# Patient Record
Sex: MALE | NOT HISPANIC OR LATINO | URBAN - METROPOLITAN AREA
[De-identification: names, ages, dates, MRNs, and addresses within clinical notes are randomized per-mention and may not be internally consistent; named-entity substitution may affect disease eponyms.]

---

## 2022-12-19 ENCOUNTER — TELEPHONE (OUTPATIENT)
Dept: GASTROENTEROLOGY | Facility: CLINIC | Age: 67
End: 2022-12-19

## 2022-12-19 NOTE — TELEPHONE ENCOUNTER
12/19/22  Screened by: Hernan Perez    Referring Provider     Pre- Screening: There is no height or weight on file to calculate BMI  Has patient been referred for a routine screening Colonoscopy? yes  Is the patient between 39-70 years old? yes      Previous Colonoscopy yes   If yes:    Date: 1/2018    Facility:     Reason:       SCHEDULING STAFF: If the patient is between 45yrs-49yrs, please advise patient to confirm benefits/coverage with their insurance company for a routine screening colonoscopy, some insurance carriers will only cover at [address] or older  If the patient is over 66years old, please schedule an office visit  Does the patient want to see a Gastroenterologist prior to their procedure OR are they having any GI symptoms? no    Has the patient been hospitalized or had abdominal surgery in the past 6 months? no    Does the patient use supplemental oxygen? no    Does the patient take Coumadin, Lovenox, Plavix, Elliquis, Xarelto, or other blood thinning medication? no    Has the patient had a stroke, cardiac event, or stent placed in the past year? yes    SCHEDULING STAFF: If patient answers NO to above questions, then schedule procedure  If patient answers YES to above questions, then schedule office appointment  Pt Failed OA    If patient is between 45yrs - 49yrs, please advise patient that we will have to confirm benefits & coverage with their insurance company for a routine screening colonoscopy

## 2022-12-19 NOTE — TELEPHONE ENCOUNTER
Patients GI provider:  EDEL Wade    Number to return call: [phone]    Reason for call: Pt provided insurance information but was unable verify       Scheduled procedure/appointment date if applicable: 0/24/3814

## 2023-03-24 ENCOUNTER — OFFICE VISIT (OUTPATIENT)
Dept: GASTROENTEROLOGY | Facility: CLINIC | Age: 68
End: 2023-03-24

## 2023-03-24 ENCOUNTER — TELEPHONE (OUTPATIENT)
Dept: OTHER | Facility: OTHER | Age: 68
End: 2023-03-24

## 2023-03-24 ENCOUNTER — TELEPHONE (OUTPATIENT)
Dept: GASTROENTEROLOGY | Facility: CLINIC | Age: 68
End: 2023-03-24

## 2023-03-24 VITALS
DIASTOLIC BLOOD PRESSURE: 97 MMHG | SYSTOLIC BLOOD PRESSURE: 198 MMHG | WEIGHT: 225 LBS | BODY MASS INDEX: 31.5 KG/M2 | HEIGHT: 71 IN

## 2023-03-24 DIAGNOSIS — R19.7 DIARRHEA, UNSPECIFIED TYPE: Primary | ICD-10-CM

## 2023-03-24 RX ORDER — SIMVASTATIN 20 MG
20 TABLET ORAL EVERY EVENING
COMMUNITY
Start: 2023-03-15

## 2023-03-24 RX ORDER — FEXOFENADINE HYDROCHLORIDE 60 MG/1
TABLET, FILM COATED ORAL
COMMUNITY

## 2023-03-24 RX ORDER — ALBUTEROL SULFATE 0.63 MG/3ML
SOLUTION RESPIRATORY (INHALATION)
COMMUNITY

## 2023-03-24 RX ORDER — FINASTERIDE 5 MG/1
5 TABLET, FILM COATED ORAL DAILY
COMMUNITY
Start: 2023-02-14

## 2023-03-24 RX ORDER — LEVOTHYROXINE SODIUM 0.15 MG/1
TABLET ORAL
COMMUNITY
Start: 2023-03-15

## 2023-03-24 RX ORDER — IBUPROFEN 200 MG
TABLET ORAL
COMMUNITY

## 2023-03-24 RX ORDER — HYDROCHLOROTHIAZIDE 25 MG/1
TABLET ORAL
COMMUNITY

## 2023-03-24 RX ORDER — OMEPRAZOLE 20 MG/1
20 CAPSULE, DELAYED RELEASE ORAL DAILY
COMMUNITY
Start: 2023-02-04

## 2023-03-24 RX ORDER — FLUTICASONE PROPIONATE 50 MCG
SPRAY, SUSPENSION (ML) NASAL
COMMUNITY

## 2023-03-24 NOTE — PROGRESS NOTES
[facility] [facility] - Outpatient Consultation  Cate Raygoza  75 y o  male MRN: [id number]  Encounter: [id number]    ASSESSMENT AND PLAN:      1  Diarrhea  6-month history of frequent diarrhea occurring 2-3 times per week-reports watery explosive BM 2-3 times each episode  Mild associated abdominal cramping  Does have formed bowel movements at times, no melena or rectal bleeding  Antibiotic use in January for sinusitis  No recent travel  -Check stool samples for C  difficile, Giardia and stool enteric panel  -Check labs including CBC, CMP and TSH  -Biopsy for microscopic colitis on colonoscopy as below    2  History of colon polyp  Colonoscopy January 2018-1 tubular adenoma and 1 sessile adenoma excised  Due for surveillance  Also reports family history of colon polyps in his brother  Reports father found to have metastatic colon cancer on autopsy at age 68  -[facility]uled [facility]    Followup Appointment: 3 months  ______________________________________________________________________    Chief Complaint   Patient presents with   • Schedule Colonoscopy       HPI:   Cate Raygoza  is a 75y o  year old male who presents to schedule screening colonoscopy  Prior colonoscopy in January 2018- 1 adenomatous polyp and 1 serrated adenoma excised  Patient is due for surveillance colonoscopy    Reports frequent diarrhea over the past 6 months with watery explosive bowel movements 1-2 times per week  Can have 2-3 bowel movements with episodes  Mild associated lower abdominal cramping which improves after bowel movement    Does have formed bowel movements and denies melena or rectal bleeding    Reports significant stress related to death of his brother and handling his estate    Was treated with antibiotics in January due to sinus infection-denies increased diarrhea after antibiotic use  Denies recent travel, no change in medication        Historical Information Past Medical History:   Diagnosis Date   • Colon polyp    • GERD (gastroesophageal reflux disease)    • Hyperlipidemia    • Seasonal allergies    • Thyroid disease      Past Surgical History:   Procedure Laterality Date   • COLONOSCOPY       Social History     Substance and Sexual Activity   Alcohol Use Yes    Comment: occasional     Social History     Substance and Sexual Activity   Drug Use Not on file     Social History     Tobacco Use   Smoking Status Never   Smokeless Tobacco Never     Family History   Problem Relation Age of Onset   • Colon cancer Father    • Diabetes Brother        Meds/Allergies     Current Outpatient Medications:   •  albuterol (ACCUNEB) 0 63 MG/3ML nebulizer solution  •  fexofenadine (ALLEGRA) 60 MG tablet  •  finasteride (PROSCAR) 5 mg tablet  •  fluticasone (FLONASE) 50 mcg/act nasal spray  •  hydrochlorothiazide (HYDRODIURIL) 25 mg tablet  •  ibuprofen (MOTRIN) 200 mg tablet  •  levothyroxine 150 mcg tablet  •  omeprazole (PriLOSEC) 20 mg delayed release capsule  •  simvastatin (ZOCOR) 20 mg tablet    No Known Allergies    PHYSICAL EXAM:    Blood pressure (!) 198/97, height 5' 10 5" (1 791 m), weight 102 kg (225 lb)  Body mass index is 31 83 kg/m²  General Appearance: NAD, cooperative, alert  Eyes: Anicteric  ENT:  Normocephalic, atraumatic, normal mucosa  Neck:  Supple, symmetrical, trachea midline,   Resp:  Clear to auscultation bilaterally; no rales, rhonchi or wheezing; respirations unlabored   CV:  S1 S2, Regular rate and rhythm; no murmur, rub, or gallop  GI:  Soft, non-tender, non-distended; normal bowel sounds; no masses, no organomegaly   Rectal: Deferred  Musculoskeletal: No cyanosis, clubbing or edema  Normal ROM  Skin:  No jaundice, rashes, or lesions   Psych: Normal affect, good eye contact  Neuro: No gross deficits, AAOx3          REVIEW OF SYSTEMS:    CONSTITUTIONAL: Denies any fever, chills, rigors, and weight loss  HEENT: No earache or tinnitus   Denies hearing loss or visual disturbances  CARDIOVASCULAR: No chest pain or palpitations  RESPIRATORY: Denies any cough, hemoptysis, shortness of breath or dyspnea on exertion  GASTROINTESTINAL: As noted in the History of Present Illness  GENITOURINARY: No problems with urination  Denies any hematuria or dysuria  NEUROLOGIC: No dizziness or vertigo, denies headaches  MUSCULOSKELETAL: Denies any muscle or joint pain  SKIN: Denies skin rashes or itching  ENDOCRINE: Denies excessive thirst  Denies intolerance to heat or cold  PSYCHOSOCIAL: Denies depression or anxiety  Denies any recent memory loss

## 2023-03-24 NOTE — LETTER
March 24, 2023     [facility], [address] [facility]    Patient: Jyoti Ayala  YOB: 1955   Date of Visit: 3/24/2023       Dear Dr Heraclio Qureshi: Thank you for referring Zulay Castillo to me for evaluation  Below are my notes for this consultation  If you have questions, please do not hesitate to call me  I look forward to following your patient along with you  Sincerely,        YIN Meza        CC: No Recipients  YIN Meza  3/24/2023  2:59 PM  Incomplete    [address] [facility] - Outpatient Consultation  Jyoti Ayala  75 y o  male MRN: [id number]  Encounter: [id number]    ASSESSMENT AND PLAN:      1  Diarrhea  6-month history of frequent diarrhea occurring 2-3 times per week-reports watery explosive BM 2-3 times each episode  Mild associated abdominal cramping  Does have formed bowel movements at times, no melena or rectal bleeding  Antibiotic use in January for sinusitis  No recent travel  -Check stool samples for C  difficile, Giardia and stool enteric panel  -Check labs including CBC, CMP and TSH  -Biopsy for microscopic colitis on colonoscopy as below    2  History of colon polyp  Colonoscopy January 2018-1 tubular adenoma and 1 sessile adenoma excised  Due for surveillance  Also reports family history of colon polyps in his brother  Reports father found to have metastatic colon cancer on autopsy at age 68  -Scheduled for colonoscopy at [facility]    Followup Appointment: As needed  ______________________________________________________________________    Chief Complaint   Patient presents with   • Schedule Colonoscopy       HPI:   Jyoti Ayala  is a 75y o  year old male who presents to schedule screening colonoscopy  Prior colonoscopy in January 2018- 1 adenomatous polyp and 1 serrated adenoma excised    Patient is due for surveillance colonoscopy    Reports frequent diarrhea over the past 6 months with watery explosive bowel movements 1-2 times per week  Can have 2-3 bowel movements with episodes  Mild associated lower abdominal cramping which improves after bowel movement  Does have formed bowel movements and denies melena or rectal bleeding    Reports significant stress related to death of his brother and handling his estate    Was treated with antibiotics in January due to sinus infection-denies increased diarrhea after antibiotic use  Denies recent travel, no change in medication        Historical Information   Past Medical History:   Diagnosis Date   • Colon polyp    • GERD (gastroesophageal reflux disease)    • Hyperlipidemia    • Seasonal allergies    • Thyroid disease      Past Surgical History:   Procedure Laterality Date   • COLONOSCOPY       Social History     Substance and Sexual Activity   Alcohol Use Yes    Comment: occasional     Social History     Substance and Sexual Activity   Drug Use Not on file     Social History     Tobacco Use   Smoking Status Never   Smokeless Tobacco Never     Family History   Problem Relation Age of Onset   • Colon cancer Father    • Diabetes Brother        Meds/Allergies      Current Outpatient Medications:   •  albuterol (ACCUNEB) 0 63 MG/3ML nebulizer solution  •  fexofenadine (ALLEGRA) 60 MG tablet  •  finasteride (PROSCAR) 5 mg tablet  •  fluticasone (FLONASE) 50 mcg/act nasal spray  •  hydrochlorothiazide (HYDRODIURIL) 25 mg tablet  •  ibuprofen (MOTRIN) 200 mg tablet  •  levothyroxine 150 mcg tablet  •  omeprazole (PriLOSEC) 20 mg delayed release capsule  •  simvastatin (ZOCOR) 20 mg tablet    No Known Allergies    PHYSICAL EXAM:    Blood pressure (!) 198/97, height 5' 10 5" (1 791 m), weight 102 kg (225 lb)  Body mass index is 31 83 kg/m²  General Appearance: NAD, cooperative, alert  Eyes: Anicteric  ENT:  Normocephalic, atraumatic, normal mucosa      Neck:  Supple, symmetrical, trachea midline,   Resp:  Clear to auscultation bilaterally; no rales, rhonchi or wheezing; respirations unlabored   CV:  S1 S2, Regular rate and rhythm; no murmur, rub, or gallop  GI:  Soft, non-tender, non-distended; normal bowel sounds; no masses, no organomegaly   Rectal: Deferred  Musculoskeletal: No cyanosis, clubbing or edema  Normal ROM  Skin:  No jaundice, rashes, or lesions   Psych: Normal affect, good eye contact  Neuro: No gross deficits, AAOx3          REVIEW OF SYSTEMS:    CONSTITUTIONAL: Denies any fever, chills, rigors, and weight loss  HEENT: No earache or tinnitus  Denies hearing loss or visual disturbances  CARDIOVASCULAR: No chest pain or palpitations  RESPIRATORY: Denies any cough, hemoptysis, shortness of breath or dyspnea on exertion  GASTROINTESTINAL: As noted in the History of Present Illness  GENITOURINARY: No problems with urination  Denies any hematuria or dysuria  NEUROLOGIC: No dizziness or vertigo, denies headaches  MUSCULOSKELETAL: Denies any muscle or joint pain  SKIN: Denies skin rashes or itching  ENDOCRINE: Denies excessive thirst  Denies intolerance to heat or cold  PSYCHOSOCIAL: Denies depression or anxiety  Denies any recent memory loss

## 2023-03-24 NOTE — TELEPHONE ENCOUNTER
Patient is requesting a call back from the office regarding some questions he has from today's visit  He needs to have blood work done but does not have an order to take to [employer] and regarding the stool sample he wants to know if the sample has to be firm stool or if its diarrhea can that be used    Please advise

## 2023-03-24 NOTE — TELEPHONE ENCOUNTER
Scheduled date of colonoscopy (as of today): 4/21/23  Physician performing colonoscopy: Dr Mike Lombardo  [facility]  Bowel prep reviewed with patient: Miralax  Instructions reviewed with patient by: Rob Paz  Clearances: none    **patient was given C-diff orders-patient knows he needs to get done soon

## 2023-03-24 NOTE — TELEPHONE ENCOUNTER
I left two voicemail's for patient, advising blood work & stool orders have been electronically submitted to [facility]  He can go to any service center for blood draw & obtain stool specimen containers  I will put a hard copy of the orders at our  if he would like to

## 2023-03-29 LAB
ALBUMIN SERPL-MCNC: 4.5 G/DL (ref 3.8–4.8)
ALBUMIN/GLOB SERPL: 1.9 {RATIO} (ref 1.2–2.2)
ALP SERPL-CCNC: 60 IU/L (ref 44–121)
ALT SERPL-CCNC: 17 IU/L (ref 0–44)
AST SERPL-CCNC: 23 IU/L (ref 0–40)
BASOPHILS # BLD AUTO: 0.1 X10E3/UL (ref 0–0.2)
BASOPHILS NFR BLD AUTO: 1 %
BILIRUB SERPL-MCNC: 1 MG/DL (ref 0–1.2)
BUN SERPL-MCNC: 20 MG/DL (ref 8–27)
BUN/CREAT SERPL: 16 (ref 10–24)
CALCIUM SERPL-MCNC: 9.8 MG/DL (ref 8.6–10.2)
CHLORIDE SERPL-SCNC: 107 MMOL/L (ref 96–106)
CO2 SERPL-SCNC: 27 MMOL/L (ref 20–29)
CREAT SERPL-MCNC: 1.23 MG/DL (ref 0.76–1.27)
EGFR: 64 ML/MIN/1.73
EOSINOPHIL # BLD AUTO: 0.2 X10E3/UL (ref 0–0.4)
EOSINOPHIL NFR BLD AUTO: 2 %
ERYTHROCYTE [DISTWIDTH] IN BLOOD BY AUTOMATED COUNT: 12.6 % (ref 11.6–15.4)
GLOBULIN SER-MCNC: 2.4 G/DL (ref 1.5–4.5)
GLUCOSE SERPL-MCNC: 89 MG/DL (ref 70–99)
HCT VFR BLD AUTO: 45 % (ref 37.5–51)
HGB BLD-MCNC: 15.1 G/DL (ref 13–17.7)
IMM GRANULOCYTES # BLD: 0 X10E3/UL (ref 0–0.1)
IMM GRANULOCYTES NFR BLD: 1 %
LYMPHOCYTES # BLD AUTO: 1.5 X10E3/UL (ref 0.7–3.1)
LYMPHOCYTES NFR BLD AUTO: 20 %
MCH RBC QN AUTO: 29.7 PG (ref 26.6–33)
MCHC RBC AUTO-ENTMCNC: 33.6 G/DL (ref 31.5–35.7)
MCV RBC AUTO: 89 FL (ref 79–97)
MONOCYTES # BLD AUTO: 0.6 X10E3/UL (ref 0.1–0.9)
MONOCYTES NFR BLD AUTO: 9 %
NEUTROPHILS # BLD AUTO: 4.9 X10E3/UL (ref 1.4–7)
NEUTROPHILS NFR BLD AUTO: 67 %
PLATELET # BLD AUTO: 256 X10E3/UL (ref 150–450)
POTASSIUM SERPL-SCNC: 4.5 MMOL/L (ref 3.5–5.2)
PROT SERPL-MCNC: 6.9 G/DL (ref 6–8.5)
RBC # BLD AUTO: 5.08 X10E6/UL (ref 4.14–5.8)
SODIUM SERPL-SCNC: 147 MMOL/L (ref 134–144)
TSH SERPL DL<=0.005 MIU/L-ACNC: 1.81 UIU/ML (ref 0.45–4.5)
WBC # BLD AUTO: 7.2 X10E3/UL (ref 3.4–10.8)

## 2023-03-31 LAB
ADV 40+41 DNA STL QL NAA+NON-PROBE: NOT DETECTED
ASTRO TYP 1-8 RNA STL QL NAA+NON-PROBE: NOT DETECTED
C CAYETANENSIS DNA STL QL NAA+NON-PROBE: NOT DETECTED
C COLI+JEJ+UPSA DNA STL QL NAA+NON-PROBE: NOT DETECTED
C DIF TOX TCDA+TCDB STL QL NAA+NON-PROBE: NOT DETECTED
C DIFF TOX GENS STL QL NAA+PROBE: NEGATIVE
CRYPTOSP DNA STL QL NAA+NON-PROBE: NOT DETECTED
E COLI O157 DNA STL QL NAA+NON-PROBE: NORMAL
E HISTOLYT DNA STL QL NAA+NON-PROBE: NOT DETECTED
EAEC PAA PLAS AGGR+AATA ST NAA+NON-PRB: NOT DETECTED
EC STX1+STX2 GENES STL QL NAA+NON-PROBE: NOT DETECTED
EPEC EAE GENE STL QL NAA+NON-PROBE: NOT DETECTED
ETEC LTA+ST1A+ST1B TOX ST NAA+NON-PROBE: NOT DETECTED
G LAMBLIA AG STL QL IA: NEGATIVE
G LAMBLIA DNA STL QL NAA+NON-PROBE: NOT DETECTED
NOROVIRUS GI+II RNA STL QL NAA+NON-PROBE: NOT DETECTED
P SHIGELLOIDES DNA STL QL NAA+NON-PROBE: NOT DETECTED
RVA RNA STL QL NAA+NON-PROBE: NOT DETECTED
S ENT+BONG DNA STL QL NAA+NON-PROBE: NOT DETECTED
SAPO I+II+IV+V RNA STL QL NAA+NON-PROBE: NOT DETECTED
SHIGELLA SP+EIEC IPAH ST NAA+NON-PROBE: NOT DETECTED
V CHOL+PARA+VUL DNA STL QL NAA+NON-PROBE: NOT DETECTED
V CHOLERAE DNA STL QL NAA+NON-PROBE: NOT DETECTED
Y ENTEROCOL DNA STL QL NAA+NON-PROBE: NOT DETECTED

## 2023-03-31 NOTE — RESULT ENCOUNTER NOTE
Stool cultures negative  Left voicemail for patient to inform him of these results    He is scheduled for colonoscopy 4/21/2023

## 2023-04-06 ENCOUNTER — TELEPHONE (OUTPATIENT)
Dept: GASTROENTEROLOGY | Facility: CLINIC | Age: 68
End: 2023-04-06

## 2023-04-21 ENCOUNTER — HOSPITAL ENCOUNTER (EMERGENCY)
Facility: HOSPITAL | Age: 68
Discharge: HOME | End: 2023-04-21
Attending: EMERGENCY MEDICINE

## 2023-04-21 ENCOUNTER — APPOINTMENT (EMERGENCY)
Dept: RADIOLOGY | Facility: HOSPITAL | Age: 68
End: 2023-04-21

## 2023-04-21 ENCOUNTER — HOSPITAL ENCOUNTER (OUTPATIENT)
Dept: GASTROENTEROLOGY | Facility: AMBULATORY SURGERY CENTER | Age: 68
Discharge: HOME | End: 2023-04-21

## 2023-04-21 VITALS
RESPIRATION RATE: 14 BRPM | WEIGHT: 225 LBS | TEMPERATURE: 98 F | OXYGEN SATURATION: 97 % | DIASTOLIC BLOOD PRESSURE: 90 MMHG | SYSTOLIC BLOOD PRESSURE: 200 MMHG | BODY MASS INDEX: 32.21 KG/M2 | HEIGHT: 70 IN | HEART RATE: 41 BPM

## 2023-04-21 VITALS
DIASTOLIC BLOOD PRESSURE: 114 MMHG | HEIGHT: 71 IN | TEMPERATURE: 97.4 F | RESPIRATION RATE: 16 BRPM | OXYGEN SATURATION: 95 % | BODY MASS INDEX: 31.5 KG/M2 | WEIGHT: 225 LBS | HEART RATE: 40 BPM | SYSTOLIC BLOOD PRESSURE: 245 MMHG

## 2023-04-21 DIAGNOSIS — R19.7 DIARRHEA, UNSPECIFIED TYPE: ICD-10-CM

## 2023-04-21 DIAGNOSIS — I48.92 ATRIAL FLUTTER (HCC): Primary | ICD-10-CM

## 2023-04-21 LAB
2HR DELTA HS TROPONIN: -15 NG/L
ALBUMIN SERPL BCP-MCNC: 4.7 G/DL (ref 3.5–5)
ALP SERPL-CCNC: 54 U/L (ref 34–104)
ALT SERPL W P-5'-P-CCNC: 18 U/L (ref 7–52)
ANION GAP SERPL CALCULATED.3IONS-SCNC: 8 MMOL/L (ref 4–13)
AST SERPL W P-5'-P-CCNC: 24 U/L (ref 13–39)
BASOPHILS # BLD AUTO: 0.05 THOUSANDS/ΜL (ref 0–0.1)
BASOPHILS NFR BLD AUTO: 1 % (ref 0–1)
BILIRUB SERPL-MCNC: 2.64 MG/DL (ref 0.2–1)
BUN SERPL-MCNC: 16 MG/DL (ref 5–25)
CALCIUM SERPL-MCNC: 10.1 MG/DL (ref 8.4–10.2)
CARDIAC TROPONIN I PNL SERPL HS: 28 NG/L
CARDIAC TROPONIN I PNL SERPL HS: 43 NG/L
CHLORIDE SERPL-SCNC: 103 MMOL/L (ref 96–108)
CO2 SERPL-SCNC: 28 MMOL/L (ref 21–32)
CREAT SERPL-MCNC: 1.04 MG/DL (ref 0.6–1.3)
EOSINOPHIL # BLD AUTO: 0.1 THOUSAND/ΜL (ref 0–0.61)
EOSINOPHIL NFR BLD AUTO: 1 % (ref 0–6)
ERYTHROCYTE [DISTWIDTH] IN BLOOD BY AUTOMATED COUNT: 13.3 % (ref 11.6–15.1)
GFR SERPL CREATININE-BSD FRML MDRD: 73 ML/MIN/1.73SQ M
GLUCOSE SERPL-MCNC: 100 MG/DL (ref 65–140)
HCT VFR BLD AUTO: 47.3 % (ref 36.5–49.3)
HGB BLD-MCNC: 15.2 G/DL (ref 12–17)
IMM GRANULOCYTES # BLD AUTO: 0.02 THOUSAND/UL (ref 0–0.2)
IMM GRANULOCYTES NFR BLD AUTO: 0 % (ref 0–2)
LYMPHOCYTES # BLD AUTO: 1.75 THOUSANDS/ΜL (ref 0.6–4.47)
LYMPHOCYTES NFR BLD AUTO: 22 % (ref 14–44)
MCH RBC QN AUTO: 29.4 PG (ref 26.8–34.3)
MCHC RBC AUTO-ENTMCNC: 32.1 G/DL (ref 31.4–37.4)
MCV RBC AUTO: 92 FL (ref 82–98)
MONOCYTES # BLD AUTO: 0.8 THOUSAND/ΜL (ref 0.17–1.22)
MONOCYTES NFR BLD AUTO: 10 % (ref 4–12)
NEUTROPHILS # BLD AUTO: 5.18 THOUSANDS/ΜL (ref 1.85–7.62)
NEUTS SEG NFR BLD AUTO: 66 % (ref 43–75)
NRBC BLD AUTO-RTO: 0 /100 WBCS
PLATELET # BLD AUTO: 218 THOUSANDS/UL (ref 149–390)
PMV BLD AUTO: 12.2 FL (ref 8.9–12.7)
POTASSIUM SERPL-SCNC: 3.6 MMOL/L (ref 3.5–5.3)
PROT SERPL-MCNC: 7.6 G/DL (ref 6.4–8.4)
RBC # BLD AUTO: 5.17 MILLION/UL (ref 3.88–5.62)
SODIUM SERPL-SCNC: 139 MMOL/L (ref 135–147)
WBC # BLD AUTO: 7.9 THOUSAND/UL (ref 4.31–10.16)

## 2023-04-21 RX ORDER — SODIUM CHLORIDE, SODIUM LACTATE, POTASSIUM CHLORIDE, CALCIUM CHLORIDE 600; 310; 30; 20 MG/100ML; MG/100ML; MG/100ML; MG/100ML
50 INJECTION, SOLUTION INTRAVENOUS CONTINUOUS
Status: DISCONTINUED | OUTPATIENT
Start: 2023-04-21 | End: 2023-04-25 | Stop reason: HOSPADM

## 2023-04-21 NOTE — QUICK NOTE
Placed pt on Monitor to see he is in A  Flutter at rate of 40 /114  Pt asymptomatic  Dr Neyda Mccullough made aware   Procedure cancelled

## 2023-04-21 NOTE — ED NOTES
Patient called from waiting room - spouse stated he is in the restroom      Irgatito Sandoval RN  04/21/23 2620

## 2023-04-21 NOTE — ED PROVIDER NOTES
History  Chief Complaint   Patient presents with   • Abnormal ECG     Pt to er from GI doc  Was scheduled for a colonoscopy today but did not have it completed due to his ekg showing that he was in a-flutter with a rate of 30's-40's  No hx  Patient has no complaints  • Hypertension     Pt had a high blood pressure at gi appt (240/117 per staff)  Patient has no complaints  Denies chest pain, sob, blurred vision      75 yom presents for evaluation of asymptomatic bradycardia/atrial flutter  Patient was being evaluated for colonoscopy today and during screening found to be bradycardic  EKG reveals atrial flutter  Patient denies any symptoms of chest pain, shortness of breath, nausea, vomiting, diarrhea, abdominal pain or lightheadedness  Overall feels well  Prior to Admission Medications   Prescriptions Last Dose Informant Patient Reported? Taking?    albuterol (PROVENTIL HFA,VENTOLIN HFA) 90 mcg/act inhaler   Yes No   Sig: Inhale 2 puffs every 6 (six) hours as needed for wheezing   fexofenadine (ALLEGRA) 60 MG tablet   Yes No   finasteride (PROSCAR) 5 mg tablet   Yes No   Sig: Take 5 mg by mouth daily   fluticasone (FLONASE) 50 mcg/act nasal spray   Yes No   hydrochlorothiazide (HYDRODIURIL) 25 mg tablet   Yes No   ibuprofen (MOTRIN) 200 mg tablet   Yes No   levothyroxine 150 mcg tablet   Yes No   Sig: TAKE 1 TABLET BY MOUTH EVERY DAY IN THE MORNING ON EMPTY STOMACH   omeprazole (PriLOSEC) 20 mg delayed release capsule   Yes No   Sig: Take 20 mg by mouth daily   simvastatin (ZOCOR) 20 mg tablet   Yes No   Sig: Take 20 mg by mouth every evening      Facility-Administered Medications: None       Past Medical History:   Diagnosis Date   • Colon polyp    • GERD (gastroesophageal reflux disease)    • Hyperlipidemia    • Seasonal allergies    • Thyroid disease        Past Surgical History:   Procedure Laterality Date   • ACHILLES TENDON REPAIR Left    • COLONOSCOPY         Family History   Problem Relation Age of Onset   • Colon cancer Father    • Diabetes Brother      I have reviewed and agree with the history as documented  E-Cigarette/Vaping   • E-Cigarette Use Never User      E-Cigarette/Vaping Substances     Social History     Tobacco Use   • Smoking status: Never   • Smokeless tobacco: Never   Vaping Use   • Vaping Use: Never used   Substance Use Topics   • Alcohol use: Yes     Comment: occasional   • Drug use: Never       Review of Systems   Constitutional: Negative for fever  Respiratory: Negative for shortness of breath  Neurological: Negative for light-headedness  Physical Exam  Physical Exam  Vitals and nursing note reviewed  Constitutional:       General: He is not in acute distress  Appearance: He is well-developed  HENT:      Head: Normocephalic and atraumatic  Right Ear: External ear normal       Left Ear: External ear normal       Nose: Nose normal    Eyes:      General: No scleral icterus  Cardiovascular:      Rate and Rhythm: Bradycardia present  Pulmonary:      Effort: Pulmonary effort is normal  No respiratory distress  Abdominal:      General: There is no distension  Palpations: Abdomen is soft  Musculoskeletal:         General: No deformity  Normal range of motion  Cervical back: Normal range of motion and neck supple  Skin:     General: Skin is warm  Findings: No rash  Neurological:      General: No focal deficit present  Mental Status: He is alert        Gait: Gait normal    Psychiatric:         Mood and Affect: Mood normal          Vital Signs  ED Triage Vitals   Temperature Pulse Respirations Blood Pressure SpO2   04/21/23 1442 04/21/23 1438 04/21/23 1438 04/21/23 1438 04/21/23 1438   98 °F (36 7 °C) (!) 42 18 134/78 97 %      Temp Source Heart Rate Source Patient Position - Orthostatic VS BP Location FiO2 (%)   04/21/23 1442 04/21/23 1438 04/21/23 1438 04/21/23 1438 --   Temporal Monitor Lying Right arm       Pain Score       -- Vitals:    04/21/23 1438 04/21/23 1439   BP: 134/78    Pulse: (!) 42 (!) 38   Patient Position - Orthostatic VS: Lying          Visual Acuity      ED Medications  Medications - No data to display    Diagnostic Studies  Results Reviewed     Procedure Component Value Units Date/Time    HS Troponin I 4hr [[id number]]     Lab Status: No result Specimen: Blood     HS Troponin 0hr (reflex protocol) [[id number]]  (Normal) Collected: 04/21/23 1440    Lab Status: Final result Specimen: Blood from Arm, Left Updated: 04/21/23 1507     hs TnI 0hr 43 ng/L     HS Troponin I 2hr [[id number]]     Lab Status: No result Specimen: Blood     Comprehensive metabolic panel [[id number]]  (Abnormal) Collected: 04/21/23 1440    Lab Status: Final result Specimen: Blood from Arm, Left Updated: 04/21/23 1459     Sodium 139 mmol/L      Potassium 3 6 mmol/L      Chloride 103 mmol/L      CO2 28 mmol/L      ANION GAP 8 mmol/L      BUN 16 mg/dL      Creatinine 1 04 mg/dL      Glucose 100 mg/dL      Calcium 10 1 mg/dL      AST 24 U/L      ALT 18 U/L      Alkaline Phosphatase 54 U/L      Total Protein 7 6 g/dL      Albumin 4 7 g/dL      Total Bilirubin 2 64 mg/dL      eGFR 73 ml/min/1 73sq m     Narrative:      Kenny guidelines for Chronic Kidney Disease (CKD):   •  Stage 1 with normal or high GFR (GFR > 90 mL/min/1 73 square meters)  •  Stage 2 Mild CKD (GFR = 60-89 mL/min/1 73 square meters)  •  Stage 3A Moderate CKD (GFR = 45-59 mL/min/1 73 square meters)  •  Stage 3B Moderate CKD (GFR = 30-44 mL/min/1 73 square meters)  •  Stage 4 Severe CKD (GFR = 15-29 mL/min/1 73 square meters)  •  Stage 5 End Stage CKD (GFR <15 mL/min/1 73 square meters)  Note: GFR calculation is accurate only with a steady state creatinine    CBC and differential [[id number]] Collected: 04/21/23 1440    Lab Status: Final result Specimen: Blood from Arm, Left Updated: 04/21/23 1445     WBC 7 90 Thousand/uL      RBC 5 17 Million/uL      Hemoglobin 15 2 g/dL      Hematocrit 47 3 %      MCV 92 fL      MCH 29 4 pg      MCHC 32 1 g/dL      RDW 13 3 %      MPV 12 2 fL      Platelets 771 Thousands/uL      nRBC 0 /100 WBCs      Neutrophils Relative 66 %      Immat GRANS % 0 %      Lymphocytes Relative 22 %      Monocytes Relative 10 %      Eosinophils Relative 1 %      Basophils Relative 1 %      Neutrophils Absolute 5 18 Thousands/µL      Immature Grans Absolute 0 02 Thousand/uL      Lymphocytes Absolute 1 75 Thousands/µL      Monocytes Absolute 0 80 Thousand/µL      Eosinophils Absolute 0 10 Thousand/µL      Basophils Absolute 0 05 Thousands/µL                  XR chest 1 view portable   Final Result by Viva Opitz, MD (04/21 1528)      Mild cardiomegaly      No acute cardiopulmonary disease  Workstation performed: [id number]                    Procedures  Procedures         ED Course  ED Course as of 04/21/23 1619   Fri Apr 21, 2023   1540 D/w cardiology regarding asymptomatic atrial flutter with troponin 43  Advised holter monitor and outpatient FU     1617 Procedure Note: EKG  Date/Time: 04/21/23 4:17 PM   Performed by: Vale Reza  Authorized by: Vale Reza  ECG interpreted by me, the ED Provider: yes   The EKG demonstrates:  Rate 43  Rhythm Atrial flutter with AV block 8:1  QTc 420  \No ST elevations/depressions               HEART Risk Score    Flowsheet Row Most Recent Value   Heart Score Risk Calculator    History 0 Filed at: 04/21/2023 1619   ECG 1 Filed at: 04/21/2023 1619   Age 2 Filed at: 04/21/2023 1619   Risk Factors 0 Filed at: 04/21/2023 1619   Troponin 2 Filed at: 04/21/2023 1619   HEART Score 5 Filed at: 04/21/2023 1619                        SBIRT 22yo+    Flowsheet Row Most Recent Value   Initial Alcohol Screen: US AUDIT-C     1  How often do you have a drink containing alcohol? 0 Filed at: 04/21/2023 1439   2  How many drinks containing alcohol do you have on a typical day you are drinking?   0 Filed at: 04/21/2023 1439   3a  Male UNDER 65: How often do you have five or more drinks on one occasion? 0 Filed at: 04/21/2023 1439   3b  FEMALE Any Age, or MALE 65+: How often do you have 4 or more drinks on one occassion? 0 Filed at: 04/21/2023 1439   Audit-C Score 0 Filed at: 04/21/2023 1439   COLT: How many times in the past year have you    Used an illegal drug or used a prescription medication for non-medical reasons? Never Filed at: 04/21/2023 1439                    Medical Decision Making  77-year-old male presenting with asymptomatic atrial flutter  Cardiac evaluation  EKG reveals slow atrial flutter 8:1  Discussed case with cardiology team   Likely schedule for outpatient SUDHEER cardioversion  Signed out with repeat troponin pending  Atrial flutter ([facility] [address] ): acute illness or injury  Amount and/or Complexity of Data Reviewed  External Data Reviewed: labs and notes  Labs: ordered  Radiology: ordered  ECG/medicine tests: ordered and independent interpretation performed            Disposition  Final diagnoses:   Atrial flutter ([facility] [address] )     Time reflects when diagnosis was documented in both MDM as applicable and the Disposition within this note     Time User Action Codes Description Comment    4/21/2023  3:04 PM Erika Alfaro [I48 [id number]] Atrial flutter [facility])       ED Disposition     None      Follow-up Information     Follow up With Specialties Details Why Contact Info Additional [facility]   [address] [facility] [address]  [address]  Cardiology [address], [address] [facility] [address] [facility] [address] Emergency Department Emergency Medicine  If symptoms worsen [address] [place] [address]  [address] [facility] Emergency Department, [address] [facility] [address] Patient's Medications   Discharge Prescriptions    No medications on file       Outpatient Discharge Orders   Ambulatory Referral to Cardiology   Standing Status: Future Standing Exp  Date: 04/21/24      Holter monitor   Standing Status: Future Standing Exp   Date: 04/21/27       PDMP Review     None          ED Provider  Electronically Signed by           Tomas Colby DO  04/21/23 4206

## 2023-04-21 NOTE — QUICK NOTE
bpm 220/104 hr continues to be atrial flutter at a rate of 40; copy of EKG sent with pt; pt and wife going to [facility] ER

## 2023-04-26 LAB
ATRIAL RATE: 277 BPM
QRS AXIS: -47 DEGREES
QRSD INTERVAL: 100 MS
QT INTERVAL: 498 MS
QTC INTERVAL: 420 MS
T WAVE AXIS: 74 DEGREES
VENTRICULAR RATE: 43 BPM

## 2023-05-08 ENCOUNTER — HOSPITAL ENCOUNTER (OUTPATIENT)
Dept: NON INVASIVE DIAGNOSTICS | Facility: HOSPITAL | Age: 68
Discharge: HOME | End: 2023-05-08
Attending: EMERGENCY MEDICINE

## 2023-05-08 DIAGNOSIS — I48.92 ATRIAL FLUTTER (HCC): ICD-10-CM

## 2023-05-13 NOTE — RESULT ENCOUNTER NOTE
LMOM x 1  Discussed results with cardiology on call, Dr Raissa Alves, he suggests patient f/u in the office and to start blood thinner

## 2023-05-16 ENCOUNTER — TELEPHONE (OUTPATIENT)
Dept: CARDIOLOGY CLINIC | Facility: CLINIC | Age: 68
End: 2023-05-16

## 2023-05-16 NOTE — TELEPHONE ENCOUNTER
Per girma Archer to [facility] in the [facility] or [facility] office with him this week to be seen sooner than 7/6 as scheduled  I left two voicemails for patient asking him to call back to schedule

## 2023-05-18 ENCOUNTER — OFFICE VISIT (OUTPATIENT)
Dept: CARDIOLOGY CLINIC | Facility: CLINIC | Age: 68
End: 2023-05-18

## 2023-05-18 VITALS
OXYGEN SATURATION: 97 % | DIASTOLIC BLOOD PRESSURE: 88 MMHG | WEIGHT: 231 LBS | HEART RATE: 42 BPM | BODY MASS INDEX: 33.07 KG/M2 | HEIGHT: 70 IN | SYSTOLIC BLOOD PRESSURE: 190 MMHG

## 2023-05-18 DIAGNOSIS — R00.1 BRADYCARDIA: ICD-10-CM

## 2023-05-18 DIAGNOSIS — I10 PRIMARY HYPERTENSION: ICD-10-CM

## 2023-05-18 DIAGNOSIS — I48.92 ATRIAL FLUTTER, UNSPECIFIED TYPE (HCC): Primary | ICD-10-CM

## 2023-05-18 RX ORDER — AMOXICILLIN 500 MG/1
2000 CAPSULE ORAL AS NEEDED
COMMUNITY

## 2023-05-18 RX ORDER — OXYMETAZOLINE HYDROCHLORIDE 0.05 G/100ML
2 SPRAY NASAL 2 TIMES DAILY
COMMUNITY

## 2023-05-18 RX ORDER — LISINOPRIL 10 MG/1
TABLET ORAL
COMMUNITY
Start: 2023-04-25 | End: 2023-05-18 | Stop reason: SDUPTHER

## 2023-05-18 RX ORDER — LISINOPRIL 20 MG/1
20 TABLET ORAL DAILY
Qty: 90 TABLET | Refills: 3 | Status: SHIPPED | OUTPATIENT
Start: 2023-05-18

## 2023-05-18 RX ORDER — ASCORBIC ACID 1000 MG
TABLET ORAL
COMMUNITY

## 2023-05-18 NOTE — PROGRESS NOTES
Consultation - Cardiology   Hieu Duval  70 y o  male MRN: [id number]    Encounter: [id number]    1  Atrial flutter, unspecified type ([address] )  Echo complete w/ contrast if indicated    rivaroxaban (Xarelto) 20 mg tablet      2  Primary hypertension  lisinopril (ZESTRIL) 20 mg tablet      3  Bradycardia            Assessment/Plan     Assessment:     Persistent Atrial Flutter  Bradycardia  Hypertension    Plan:    Persistent Atrial Flutter: Unclear onset and found incidentally  [id number] = 2  EP consult placed for flutter ablation  Chest xray shows cardiomegaly  No evidence of clinical heart failure on exam today  Check echocardiogram     HTN: Uncontrolled  Increase lisinopril to 20 mg daily  History of Present Illness   Physician Requesting Consult: No att  providers found  Reason for Consult / Principal Problem: Atrial Flutter  HPI: Hieu Duval  is a 70y o  year old male who presents with recent diagnosis of atrial flutter  He was having a colonoscopy and found to be in atrial flutter and his BP was markedly elevated at that time  He has been monitoring his BP at home which has been consistently high  He denies any significant shortness of breath, chest pain or palpitations  He has no prior history of atrial fibrillation, cad or chf    He has a history of Hypertension and hyperlipidemia  Review of Systems   Constitutional: Negative  HENT: Negative  Eyes: Negative  Cardiovascular: Negative  Respiratory: Negative  Endocrine: Negative  Hematologic/Lymphatic: Negative  Skin: Negative  Musculoskeletal: Negative  Gastrointestinal: Negative  Genitourinary: Negative  Neurological: Negative  Psychiatric/Behavioral: Negative  Allergic/Immunologic: Negative          Historical Information   Past Medical History:   Diagnosis Date   • Colon polyp    • GERD (gastroesophageal reflux disease)    • Hyperlipidemia    • Seasonal allergies    • Thyroid disease      Past Surgical History:   Procedure Laterality Date   • ACHILLES TENDON REPAIR Left    • COLONOSCOPY         Social History:  Social History     Substance and Sexual Activity   Alcohol Use Yes    Comment: occasional     Social History     Substance and Sexual Activity   Drug Use Never     Social History     Tobacco Use   Smoking Status Never   Smokeless Tobacco Never       Family History:   Family History   Problem Relation Age of Onset   • Colon cancer Father    • Diabetes Brother        Meds/Allergies   No Known Allergies    Current Outpatient Medications:   •  albuterol (PROVENTIL HFA,VENTOLIN HFA) 90 mcg/act inhaler, Inhale 2 puffs every 6 (six) hours as needed for wheezing, Disp: , Rfl:   •  amoxicillin (AMOXIL) 500 mg capsule, Take 2,000 mg by mouth if needed Prior to dental work, Disp: , Rfl:   •  fexofenadine (ALLEGRA) 60 MG tablet, , Disp: , Rfl:   •  finasteride (PROSCAR) 5 mg tablet, Take 5 mg by mouth daily, Disp: , Rfl:   •  fluticasone (FLONASE) 50 mcg/act nasal spray, , Disp: , Rfl:   •  hydrochlorothiazide (HYDRODIURIL) 25 mg tablet, , Disp: , Rfl:   •  ibuprofen (MOTRIN) 200 mg tablet, , Disp: , Rfl:   •  levothyroxine 150 mcg tablet, TAKE 1 TABLET BY MOUTH EVERY DAY IN THE MORNING ON EMPTY STOMACH, Disp: , Rfl:   •  lisinopril (ZESTRIL) 10 mg tablet, TAKE 1 TABLET BY MOUTH EVERY DAY FOR 30 DAYS, Disp: , Rfl:   •  Multiple Vitamins-Minerals (Mens 50+ Multivitamin) TABS, Take by mouth, Disp: , Rfl:   •  omeprazole (PriLOSEC) 20 mg delayed release capsule, Take 20 mg by mouth daily, Disp: , Rfl:   •  oxymetazoline (AFRIN) 0 05 % nasal spray, 2 sprays by Each Nare route 2 (two) times a day, Disp: , Rfl:   •  simvastatin (ZOCOR) 20 mg tablet, Take 20 mg by mouth every evening, Disp: , Rfl:     Vitals:   Pulse: (!) 42  Blood Pressue: (!) 190/88  Weight: 105 kg (231 lb)      Physical Exam  Constitutional:       General: He is not in acute distress  Appearance: He is well-developed  He is not diaphoretic  HENT:      Head: Normocephalic  Eyes:      General: No scleral icterus  Right eye: No discharge  Conjunctiva/sclera: Conjunctivae normal    Neck:      Vascular: No JVD  Cardiovascular:      Rate and Rhythm: Normal rate and regular rhythm  Heart sounds: No murmur heard  No friction rub  No gallop  Pulmonary:      Effort: Pulmonary effort is normal  No respiratory distress  Breath sounds: Normal breath sounds  No wheezing or rales  Abdominal:      General: Bowel sounds are normal  There is no distension  Palpations: Abdomen is soft  Tenderness: There is no abdominal tenderness  There is no rebound  Musculoskeletal:         General: No tenderness or deformity  Cervical back: Normal range of motion  Skin:     General: Skin is warm and dry  Neurological:      General: No focal deficit present  Mental Status: He is alert and oriented to person, place, and time  Psychiatric:         Mood and Affect: Mood normal          Behavior: Behavior normal          [unfilled]    Invasive Devices     None                 Lab Results   Component Value Date     04/21/2023    CO2 28 04/21/2023    BUN 16 04/21/2023    CREATININE 1 04 04/21/2023    EGFR 73 04/21/2023    CALCIUM 10 1 04/21/2023    AST 24 04/21/2023    ALT 18 04/21/2023    ALKPHOS 54 04/21/2023     Lab Results   Component Value Date    WBC 7 90 04/21/2023    HGB 15 2 04/21/2023     04/21/2023     No components found for: TROP    Imaging:     EKG: Atrial Flutter     Counseling / Coordination of Care  Total floor / unit time spent today 45 minutes  Greater than 50% of total time was spent with the patient and / or family counseling and / or coordination of care  A description of the counseling / coordination of care

## 2023-05-19 DIAGNOSIS — I10 PRIMARY HYPERTENSION: Primary | ICD-10-CM

## 2023-05-19 RX ORDER — LISINOPRIL 20 MG/1
20 TABLET ORAL DAILY
Qty: 90 TABLET | Refills: 1 | OUTPATIENT
Start: 2023-05-19

## 2023-05-19 NOTE — TELEPHONE ENCOUNTER
Per Dr Tate Petshyam -     Increase Lisinopril to 20 mg daily  Called - LM on VM as given      Will send new rx to listed Px

## 2023-06-05 ENCOUNTER — TELEPHONE (OUTPATIENT)
Dept: CARDIOLOGY CLINIC | Facility: CLINIC | Age: 68
End: 2023-06-05

## 2023-06-05 ENCOUNTER — HOSPITAL ENCOUNTER (OUTPATIENT)
Dept: NON INVASIVE DIAGNOSTICS | Age: 68
Discharge: HOME | End: 2023-06-05
Payer: MEDICARE

## 2023-06-05 VITALS
HEIGHT: 70 IN | HEART RATE: 40 BPM | DIASTOLIC BLOOD PRESSURE: 92 MMHG | BODY MASS INDEX: 33.07 KG/M2 | WEIGHT: 231 LBS | SYSTOLIC BLOOD PRESSURE: 196 MMHG

## 2023-06-05 DIAGNOSIS — I48.92 ATRIAL FLUTTER, UNSPECIFIED TYPE (HCC): ICD-10-CM

## 2023-06-05 PROCEDURE — 93306 TTE W/DOPPLER COMPLETE: CPT

## 2023-06-05 NOTE — TELEPHONE ENCOUNTER
"Patient wanted Dr Cabrera Maynard to be aware that he is not seeing much improvement in his blood pressure since Lisinopril being increased from 10mg to 20mg (1) time daily  Patient stated that his \"Resting\" Blood Pressure was 140/80 last week and now is running about 170-180/90    Please Advise  (thomas)  "

## 2023-06-06 LAB
AORTIC ROOT: 3.5 CM
APICAL FOUR CHAMBER EJECTION FRACTION: 64 %
ASCENDING AORTA: 4.4 CM
AV REGURGITATION PRESSURE HALF TIME: 1011 MS
DOP CALC LVOT AREA: 4.91 CM2
DOP CALC LVOT DIAMETER: 2.5 CM
E WAVE DECELERATION TIME: 279 MS
FRACTIONAL SHORTENING: 34 (ref 28–44)
INTERVENTRICULAR SEPTUM IN DIASTOLE (PARASTERNAL SHORT AXIS VIEW): 1.3 CM
INTERVENTRICULAR SEPTUM: 1.3 CM (ref 0.6–1.1)
IVC: 22 MM
LAAS-AP2: 41.4 CM2
LAAS-AP4: 29.7 CM2
LEFT ATRIUM AREA SYSTOLE SINGLE PLANE A4C: 29.9 CM2
LEFT ATRIUM SIZE: 5.9 CM
LEFT INTERNAL DIMENSION IN SYSTOLE: 3.9 CM (ref 2.1–4)
LEFT VENTRICULAR INTERNAL DIMENSION IN DIASTOLE: 5.9 CM (ref 3.5–6)
LEFT VENTRICULAR POSTERIOR WALL IN END DIASTOLE: 1.3 CM
LEFT VENTRICULAR STROKE VOLUME: 106 ML
LVSV (TEICH): 106 ML
MITRAL REGURGITATION PEAK VELOCITY: 6.14 M/S
MITRAL VALVE MEAN INFLOW VELOCITY: 5.25 M/S
MITRAL VALVE REGURGITANT PEAK GRADIENT: 151 MMHG
MV E'TISSUE VEL-SEP: 8 CM/S
MV PEAK A VEL: 0.01 M/S
MV PEAK E VEL: 150 CM/S
MV STENOSIS PRESSURE HALF TIME: 81 MS
MV VALVE AREA P 1/2 METHOD: 2.72
RA PRESSURE ESTIMATED: 15 MMHG
RIGHT ATRIUM AREA SYSTOLE A4C: 24.7 CM2
RIGHT VENTRICLE ID DIMENSION: 4.6 CM
RV PSP: 42 MMHG
SL CV AV DECELERATION TIME RETROGRADE: 3487 MS
SL CV AV PEAK GRADIENT RETROGRADE: 71 MMHG
SL CV DOP CALC MV VTI RETROGRADE: 213.9 CM
SL CV LEFT ATRIUM LENGTH A2C: 8.3 CM
SL CV LV EF: 55
SL CV MV MEAN GRADIENT RETROGRADE: 116 MMHG
SL CV PED ECHO LEFT VENTRICLE DIASTOLIC VOLUME (MOD BIPLANE) 2D: 173 ML
SL CV PED ECHO LEFT VENTRICLE SYSTOLIC VOLUME (MOD BIPLANE) 2D: 67 ML
TR MAX PG: 27 MMHG
TR PEAK VELOCITY: 2.6 M/S
TRICUSPID ANNULAR PLANE SYSTOLIC EXCURSION: 2.5 CM

## 2023-06-06 PROCEDURE — 93306 TTE W/DOPPLER COMPLETE: CPT | Performed by: INTERNAL MEDICINE

## 2023-06-08 ENCOUNTER — TELEPHONE (OUTPATIENT)
Dept: CARDIOLOGY CLINIC | Facility: CLINIC | Age: 68
End: 2023-06-08

## 2023-06-08 DIAGNOSIS — I10 PRIMARY HYPERTENSION: Primary | ICD-10-CM

## 2023-06-08 RX ORDER — HYDROCHLOROTHIAZIDE 12.5 MG/1
12.5 TABLET ORAL DAILY
Qty: 90 TABLET | Refills: 1 | Status: SHIPPED | OUTPATIENT
Start: 2023-06-08

## 2023-06-08 NOTE — TELEPHONE ENCOUNTER
"All questions in this message are from pt:    Pt wants you to know that his \"PRN\" hctz 25mg, in his chart, he is currently taking, consistently, every other day  You said add hctz 12 5mg daily so that would be 12 5mg every other day and 37 5mg every other day  Is this what you want? He is also wondering if you want to see him back sooner than August?        Please advise    "

## 2023-06-08 NOTE — TELEPHONE ENCOUNTER
BMP Benzoyl Peroxide Counseling: Patient counseled that medicine may cause skin irritation and bleach clothing.  In the event of skin irritation, the patient was advised to reduce the amount of the drug applied or use it less frequently.   The patient verbalized understanding of the proper use and possible adverse effects of benzoyl peroxide.  All of the patient's questions and concerns were addressed.

## 2023-06-08 NOTE — TELEPHONE ENCOUNTER
NELSON for pt to call us back to relay message  Sent [employer] message relaying information  BMP ordered  Medication sent to [facility], [place]

## 2023-06-12 ENCOUNTER — CONSULT (OUTPATIENT)
Dept: CARDIOLOGY CLINIC | Facility: CLINIC | Age: 68
End: 2023-06-12
Payer: MEDICARE

## 2023-06-12 VITALS
BODY MASS INDEX: 33 KG/M2 | DIASTOLIC BLOOD PRESSURE: 92 MMHG | HEART RATE: 46 BPM | SYSTOLIC BLOOD PRESSURE: 164 MMHG | WEIGHT: 230 LBS

## 2023-06-12 DIAGNOSIS — I48.92 ATRIAL FLUTTER, UNSPECIFIED TYPE (HCC): Primary | ICD-10-CM

## 2023-06-12 PROCEDURE — 99215 OFFICE O/P EST HI 40 MIN: CPT | Performed by: INTERNAL MEDICINE

## 2023-06-12 PROCEDURE — 93000 ELECTROCARDIOGRAM COMPLETE: CPT | Performed by: INTERNAL MEDICINE

## 2023-06-12 RX ORDER — QUETIAPINE FUMARATE 50 MG/1
TABLET, FILM COATED ORAL
COMMUNITY
Start: 2023-06-06

## 2023-06-12 NOTE — PROGRESS NOTES
Electrophysiology Consult   Heart & Vascular [address] [facility] Cardiology [facility]  [address] [facility], [place], [address]    Name: Aurea Arshad  : 1955  MRN: [id number]    ASSESSMENT:  1  Atrial flutter, unspecified type ([address] )  POCT ECG          PLAN:  1  Atrial flutter  a  Presents with EKG here with what appears to be atrial flutter with slow ventricular response   b  He has been on [facility] with xarelto since intially seen outpatient   c  No AV nodals given # 2  d  Echo with 23 EF 55-60%  LA severely dilated   e  Currently asx is exercising at home with no symptoms  2  Bradycardia   a  Reports normal rates 50-60's   b  holter showing avg rates 41 as low as 30's   c  Relatively asx but unable to elevate rates > 50's on treadmill per patient   d  Underlying rhythm unknown as no viable EKG without flutter   e  Discussed ppm   3  Hypertension   a  bp noramlly well controlled per patient 120's over 80's   b  Recently bp significantly elevated since rhythm disturbance   c  On lisinopril 20 mg, HCTZ 12 5 mg   d  TSH 3/28 normal     IMPRESSION:  -patient in atrial flutter slow ventricular response here    -discussed flutter ablation with patient who is unsure about procedure at this time    -unsure of underlying rhythm given no pre flutter EKG's so discussed potential for pacer if needed post ablation as well  -patient will discuss with his family and call to schedule   -as for blood pressure this is surely a response to his bradycardia  Our fix for this would be ablation  Will not add BP meds at this time as this is likely helping CO  Patient is to follow up in our EP office in pending flutter ablation decision  He is to call our office with any further questions or concerns in the meantime  HPI:   Interim history: Aurea Arshad  is a 70 y o  male with history of hypertension, hyperlipidemia, hypothyroidism, GERD   He presents today for consultation for new atrial flutter with slow ventricular response  Briefly, patient's history began in March of this year when he was undergoing testing for colonoscopy  He was found to have significantly elevated blood pressures on initial consultation for colonoscopy and was sent to the emergency department where he is found to be in 8-1 atrial flutter with slow ventricular response  Patient does follow with outside medical service and records, especially prior EKGs are unavailable at this time, however patient does state that his heart rate is usually on the lower side in the 50s and 60s  He states that recently his heart rates have been in the 40s and go as low as the 30s however he is relatively asymptomatic  Patient was sent to Dr Kvng Nguyen for consultation and found to be in atrial flutter with slow ventricular response  He was started on anticoagulation with Xarelto which he states is very expensive and is currently using samples to maintain anticoagulation status  Patient was referred to electrophysiology by Dr Elodia Rome for discussion of possible atrial flutter ablation  Patient did have outpatient Holter monitor done which did show average heart rate of 41 going as low as 30s  Patient has remained relatively active and states that he is still going to the gym and will get on the treadmill however he is unable to get his heart rates over 50s even while on the treadmill  He states that he does not get any symptoms including lightheadedness, chest pain, dizziness, shortness of breath, near syncope even with his low heart rates  Dr Eneida Dickinson and myself both discussed atrial flutter ablation with this patient and possible need for permanent pacemaker insertion as we are unsure of patient's underlying rhythm  We also discussed that his hypertension although significantly elevated is likely a compensatory response given his bradycardia      Discussed in detail procedures and recovery periods for both flutter ablation as well as possible permanent pacemaker insertion  Patient no questions at the end of consultation and stated that he would like to discuss with his family and do a little bit more research before making final decision on whether this procedure would be right for him especially in the setting of him not having symptoms  EKG: Typical atrial flutter with slow ventricular response with heart rate 43  ROS:   Review of Systems   Constitutional: Negative for malaise/fatigue, weight gain and weight loss  Cardiovascular: Negative for chest pain, dyspnea on exertion, irregular heartbeat, leg swelling, near-syncope, palpitations and syncope  Respiratory: Negative for shortness of breath and sleep disturbances due to breathing  All other systems reviewed and are negative  OBJECTIVE:   Vitals:   /92 (BP Location: Right arm, Patient Position: Sitting, Cuff Size: Large)   Pulse (!) 46   Wt 104 kg (230 lb)   BMI 33 00 kg/m²       Physical Exam  Vitals and nursing note reviewed  Constitutional:       General: He is not in acute distress  Appearance: He is obese  Cardiovascular:      Rate and Rhythm: Bradycardia present  Rhythm irregular  Pulmonary:      Effort: Pulmonary effort is normal       Breath sounds: Normal breath sounds  Abdominal:      General: Abdomen is flat  Palpations: Abdomen is soft  Musculoskeletal:      Right lower leg: No edema  Left lower leg: No edema  Skin:     General: Skin is warm and dry  Neurological:      General: No focal deficit present  Mental Status: He is alert and oriented to person, place, and time           Medications:      Current Outpatient Medications:   •  albuterol (PROVENTIL HFA,VENTOLIN HFA) 90 mcg/act inhaler, Inhale 2 puffs every 6 (six) hours as needed for wheezing, Disp: , Rfl:   •  amoxicillin (AMOXIL) 500 mg capsule, Take 2,000 mg by mouth if needed Prior to dental work, Disp: , Rfl:   •  fexofenadine (ALLEGRA) 60 MG tablet, , Disp: , Rfl:   • finasteride (PROSCAR) 5 mg tablet, Take 5 mg by mouth daily, Disp: , Rfl:   •  fluticasone (FLONASE) 50 mcg/act nasal spray, , Disp: , Rfl:   •  hydrochlorothiazide (HYDRODIURIL) 12 5 mg tablet, Take 1 tablet (12 5 mg total) by mouth daily, Disp: 90 tablet, Rfl: 1  •  hydrochlorothiazide (HYDRODIURIL) 25 mg tablet, , Disp: , Rfl:   •  ibuprofen (MOTRIN) 200 mg tablet, , Disp: , Rfl:   •  levothyroxine 150 mcg tablet, TAKE 1 TABLET BY MOUTH EVERY DAY IN THE MORNING ON EMPTY STOMACH, Disp: , Rfl:   •  lisinopril (ZESTRIL) 20 mg tablet, Take 1 tablet (20 mg total) by mouth daily, Disp: 90 tablet, Rfl: 3  •  Multiple Vitamins-Minerals (Mens 50+ Multivitamin) TABS, Take by mouth, Disp: , Rfl:   •  omeprazole (PriLOSEC) 20 mg delayed release capsule, Take 20 mg by mouth daily, Disp: , Rfl:   •  oxymetazoline (AFRIN) 0 05 % nasal spray, 2 sprays by Each Nare route 2 (two) times a day, Disp: , Rfl:   •  QUEtiapine (SEROquel) 50 mg tablet, TAKE 1 TABLET BY MOUTH EVERY DAY AT BEDTIME FOR 30 DAYS, Disp: , Rfl:   •  rivaroxaban (Xarelto) 20 mg tablet, Take 1 tablet (20 mg total) by mouth daily with breakfast, Disp: 90 tablet, Rfl: 3  •  simvastatin (ZOCOR) 20 mg tablet, Take 20 mg by mouth every evening, Disp: , Rfl:      Family History   Problem Relation Age of Onset   • Colon cancer Father    • Diabetes Brother      Social History     Socioeconomic History   • Marital status: /Civil Union     Spouse name: Not on file   • Number of children: Not on file   • Years of education: Not on file   • Highest education level: Not on file   Occupational History   • Not on file   Tobacco Use   • Smoking status: Never   • Smokeless tobacco: Never   Vaping Use   • Vaping Use: Never used   Substance and Sexual Activity   • Alcohol use: Yes     Comment: occasional   • Drug use: Never   • Sexual activity: Not on file   Other Topics Concern   • Not on file   Social History Narrative   • Not on file     Social Determinants of Health Financial Resource Strain: Not on file   Food Insecurity: Not on file   Transportation Needs: Not on file   Physical Activity: Not on file   Stress: Not on file   Social Connections: Not on file   Intimate Partner Violence: Not on file   Housing Stability: Not on file     Social History     Tobacco Use   Smoking Status Never   Smokeless Tobacco Never     Social History     Substance and Sexual Activity   Alcohol Use Yes    Comment: occasional       Labs & Results:  Below is the patient's most recent value for Albumin, ALT, AST, BUN, Calcium, Chloride, Cholesterol, CO2, Creatinine, GFR, Glucose, HDL, Hematocrit, Hemoglobin, Hemoglobin A1C, LDL, Magnesium, Phosphorus, Platelets, Potassium, PSA, Sodium, Triglycerides, and WBC  Lab Results   Component Value Date    ALT 18 04/21/2023    AST 24 04/21/2023    BUN 16 04/21/2023    CALCIUM 10 1 04/21/2023     04/21/2023    CO2 28 04/21/2023    CREATININE 1 04 04/21/2023    HCT 47 3 04/21/2023    HGB 15 2 04/21/2023    K 3 6 04/21/2023     04/21/2023    WBC 7 90 04/21/2023     Note: for a comprehensive list of the patient's lab results, access the Results Review activity  CARDIAC TESTING:   ECHO:   No results found for this or any previous visit  No results found for this or any previous visit  CATH:  No results found for this or any previous visit  STRESS TEST:  No results found for this or any previous visit

## 2023-06-20 ENCOUNTER — TELEPHONE (OUTPATIENT)
Dept: CARDIOLOGY CLINIC | Facility: CLINIC | Age: 68
End: 2023-06-20

## 2023-06-20 NOTE — TELEPHONE ENCOUNTER
Pt called stating he would like to go through with the ablation Dr Mel Cerda recommended him at his 6/12 appt  Pt would also like to know the difference in procedure between an ablation and a pacemaker  Please advise  Thank you!

## 2023-07-03 ENCOUNTER — PREP FOR PROCEDURE (OUTPATIENT)
Dept: CARDIOLOGY CLINIC | Facility: CLINIC | Age: 68
End: 2023-07-03

## 2023-07-03 ENCOUNTER — PATIENT MESSAGE (OUTPATIENT)
Dept: CARDIOLOGY CLINIC | Facility: CLINIC | Age: 68
End: 2023-07-03

## 2023-07-03 DIAGNOSIS — I48.92 ATRIAL FLUTTER, UNSPECIFIED TYPE (HCC): Primary | ICD-10-CM

## 2023-08-19 LAB
ALBUMIN SERPL-MCNC: 4.4 G/DL (ref 3.9–4.9)
ALBUMIN/GLOB SERPL: 2 {RATIO} (ref 1.2–2.2)
ALP SERPL-CCNC: 71 IU/L (ref 44–121)
ALT SERPL-CCNC: 18 IU/L (ref 0–44)
AST SERPL-CCNC: 28 IU/L (ref 0–40)
BASOPHILS # BLD AUTO: 0.1 X10E3/UL (ref 0–0.2)
BASOPHILS NFR BLD AUTO: 1 %
BILIRUB SERPL-MCNC: 1.7 MG/DL (ref 0–1.2)
BUN SERPL-MCNC: 24 MG/DL (ref 8–27)
BUN/CREAT SERPL: 21 (ref 10–24)
CALCIUM SERPL-MCNC: 9.6 MG/DL (ref 8.6–10.2)
CHLORIDE SERPL-SCNC: 105 MMOL/L (ref 96–106)
CO2 SERPL-SCNC: 22 MMOL/L (ref 20–29)
CREAT SERPL-MCNC: 1.12 MG/DL (ref 0.76–1.27)
EGFR: 71 ML/MIN/1.73
EOSINOPHIL # BLD AUTO: 0.2 X10E3/UL (ref 0–0.4)
EOSINOPHIL NFR BLD AUTO: 3 %
ERYTHROCYTE [DISTWIDTH] IN BLOOD BY AUTOMATED COUNT: 12.7 % (ref 11.6–15.4)
GLOBULIN SER-MCNC: 2.2 G/DL (ref 1.5–4.5)
GLUCOSE SERPL-MCNC: 77 MG/DL (ref 70–99)
HCT VFR BLD AUTO: 46.4 % (ref 37.5–51)
HGB BLD-MCNC: 15.1 G/DL (ref 13–17.7)
IMM GRANULOCYTES # BLD: 0 X10E3/UL (ref 0–0.1)
IMM GRANULOCYTES NFR BLD: 0 %
LYMPHOCYTES # BLD AUTO: 1.6 X10E3/UL (ref 0.7–3.1)
LYMPHOCYTES NFR BLD AUTO: 21 %
MCH RBC QN AUTO: 28.9 PG (ref 26.6–33)
MCHC RBC AUTO-ENTMCNC: 32.5 G/DL (ref 31.5–35.7)
MCV RBC AUTO: 89 FL (ref 79–97)
MONOCYTES # BLD AUTO: 0.6 X10E3/UL (ref 0.1–0.9)
MONOCYTES NFR BLD AUTO: 8 %
NEUTROPHILS # BLD AUTO: 5.3 X10E3/UL (ref 1.4–7)
NEUTROPHILS NFR BLD AUTO: 67 %
PLATELET # BLD AUTO: 214 X10E3/UL (ref 150–450)
POTASSIUM SERPL-SCNC: 4.6 MMOL/L (ref 3.5–5.2)
PROT SERPL-MCNC: 6.6 G/DL (ref 6–8.5)
RBC # BLD AUTO: 5.22 X10E6/UL (ref 4.14–5.8)
SODIUM SERPL-SCNC: 143 MMOL/L (ref 134–144)
WBC # BLD AUTO: 7.8 X10E3/UL (ref 3.4–10.8)

## 2023-08-23 ENCOUNTER — TELEPHONE (OUTPATIENT)
Dept: CARDIOLOGY CLINIC | Facility: CLINIC | Age: 68
End: 2023-08-23

## 2023-08-23 NOTE — TELEPHONE ENCOUNTER
Hi, my name is [employer]. I have an appointment tomorrow with Doctor Josemanuel Schumacher at 8:40 AM.  I was just going to notify you that I do have a little bit of a cold and I have already done a COVID test in house and it is negative repeat negative. But I wanted to find out if there was anything else I needed to know.  So please contact me at [phone]

## 2023-08-23 NOTE — TELEPHONE ENCOUNTER
Called, LM on VM requesting pt wear a mask if he is experiencing cold symptoms and to consider rescheduling his appt if he should develop fever or other sx.

## 2023-08-24 ENCOUNTER — OFFICE VISIT (OUTPATIENT)
Dept: CARDIOLOGY CLINIC | Facility: CLINIC | Age: 68
End: 2023-08-24
Payer: MEDICARE

## 2023-08-24 VITALS
HEART RATE: 65 BPM | DIASTOLIC BLOOD PRESSURE: 88 MMHG | HEIGHT: 70 IN | BODY MASS INDEX: 33 KG/M2 | OXYGEN SATURATION: 100 % | SYSTOLIC BLOOD PRESSURE: 172 MMHG

## 2023-08-24 DIAGNOSIS — I10 PRIMARY HYPERTENSION: Primary | ICD-10-CM

## 2023-08-24 PROCEDURE — 99214 OFFICE O/P EST MOD 30 MIN: CPT | Performed by: INTERNAL MEDICINE

## 2023-08-24 RX ORDER — CANDESARTAN 32 MG/1
32 TABLET ORAL DAILY
Qty: 90 TABLET | Refills: 3 | Status: SHIPPED | OUTPATIENT
Start: 2023-08-24

## 2023-08-24 NOTE — PROGRESS NOTES
Cardiology Follow Up    [address] [place] [address].  6/[birth year]/4147  [id number]  [address] [place] [address] [facility] [address] [address] [phone]    No diagnosis found. Interval History:  Followup atrial flutter. Doing well. No chest pain, dyspnea or palpitations.      Medical Problems     Problem List     GERD (gastroesophageal reflux disease)    Hyperlipidemia    Seasonal allergies    Hypothyroidism    Prostate hypertrophy        Past Medical History:   Diagnosis Date   • Colon polyp    • GERD (gastroesophageal reflux disease)    • Hyperlipidemia    • Seasonal allergies    • Thyroid disease      Social History     Socioeconomic History   • Marital status: /Civil Union     Spouse name: Not on file   • Number of children: Not on file   • Years of education: Not on file   • Highest education level: Not on file   Occupational History   • Not on file   Tobacco Use   • Smoking status: Never   • Smokeless tobacco: Never   Vaping Use   • Vaping Use: Never used   Substance and Sexual Activity   • Alcohol use: Yes     Comment: occasional   • Drug use: Never   • Sexual activity: Not on file   Other Topics Concern   • Not on file   Social History Narrative   • Not on file     Social Determinants of Health     Financial Resource Strain: Not on file   Food Insecurity: Not on file   Transportation Needs: Not on file   Physical Activity: Not on file   Stress: Not on file   Social Connections: Not on file   Intimate Partner Violence: Not on file   Housing Stability: Not on file      Family History   Problem Relation Age of Onset   • Colon cancer Father    • Diabetes Brother      Past Surgical History:   Procedure Laterality Date   • ACHILLES TENDON REPAIR Left    • COLONOSCOPY         Current Outpatient Medications:   •  albuterol (PROVENTIL HFA,VENTOLIN HFA) 90 mcg/act inhaler, Inhale 2 puffs every 6 (six) hours as needed for wheezing, Disp: , Rfl:   •  amoxicillin (AMOXIL) 500 mg capsule, Take 2,000 mg by mouth if needed Prior to dental work, Disp: , Rfl:   •  fexofenadine (ALLEGRA) 60 MG tablet, if needed, Disp: , Rfl:   •  finasteride (PROSCAR) 5 mg tablet, Take 5 mg by mouth daily, Disp: , Rfl:   •  fluticasone (FLONASE) 50 mcg/act nasal spray, if needed, Disp: , Rfl:   •  hydrochlorothiazide (HYDRODIURIL) 12.5 mg tablet, Take 1 tablet (12.5 mg total) by mouth daily, Disp: 90 tablet, Rfl: 1  •  ibuprofen (MOTRIN) 200 mg tablet, , Disp: , Rfl:   •  levothyroxine 150 mcg tablet, TAKE 1 TABLET BY MOUTH EVERY DAY IN THE MORNING ON EMPTY STOMACH, Disp: , Rfl:   •  lisinopril (ZESTRIL) 20 mg tablet, Take 1 tablet (20 mg total) by mouth daily, Disp: 90 tablet, Rfl: 3  •  Multiple Vitamins-Minerals (Mens 50+ Multivitamin) TABS, Take by mouth, Disp: , Rfl:   •  omeprazole (PriLOSEC) 20 mg delayed release capsule, Take 20 mg by mouth daily, Disp: , Rfl:   •  oxymetazoline (AFRIN) 0.05 % nasal spray, 2 sprays by Each Nare route 2 (two) times a day, Disp: , Rfl:   •  QUEtiapine (SEROquel) 50 mg tablet, TAKE 1 TABLET BY MOUTH EVERY DAY AT BEDTIME FOR 30 DAYS, Disp: , Rfl:   •  rivaroxaban (Xarelto) 20 mg tablet, Take 1 tablet (20 mg total) by mouth daily with breakfast, Disp: 90 tablet, Rfl: 3  •  simvastatin (ZOCOR) 20 mg tablet, Take 20 mg by mouth every evening, Disp: , Rfl:   No Known Allergies    Labs:     Chemistry        Component Value Date/Time    K 4.6 08/18/2023 0810     08/18/2023 0810    CO2 22 08/18/2023 0810    BUN 24 08/18/2023 0810    CREATININE 1.12 08/18/2023 0810    CREATININE 1.04 04/21/2023 1440        Component Value Date/Time    CALCIUM 10.1 04/21/2023 1440    ALKPHOS 54 04/21/2023 1440    AST 28 08/18/2023 0810    ALT 18 08/18/2023 0810            No results found for: "CHOL"  No results found for: "HDL"  No results found for: "LDLCALC"  No results found for: "TRIG"  No results found for: "CHOLHDL"    Imaging: No results found.        Review of Systems   Constitutional: Negative. HENT: Negative. Eyes: Negative. Cardiovascular: Negative. Respiratory: Negative. Endocrine: Negative. Hematologic/Lymphatic: Negative. Skin: Negative. Musculoskeletal: Negative. Gastrointestinal: Negative. Genitourinary: Negative. Neurological: Negative. Psychiatric/Behavioral: Negative. Allergic/Immunologic: Negative. Vitals:    08/24/23 0842   BP: (!) 172/88   Pulse: 65   SpO2: 100%           Physical Exam  Vitals reviewed. Constitutional:       Appearance: Normal appearance. HENT:      Head: Normocephalic. Nose: Nose normal.      Mouth/Throat:      Mouth: Mucous membranes are moist.      Pharynx: Oropharynx is clear. Eyes:      General: No scleral icterus. Conjunctiva/sclera: Conjunctivae normal.   Cardiovascular:      Rate and Rhythm: Normal rate and regular rhythm. Heart sounds: No murmur heard. No friction rub. No gallop. Pulmonary:      Effort: Pulmonary effort is normal. No respiratory distress. Breath sounds: Normal breath sounds. No wheezing or rales. Abdominal:      General: Abdomen is flat. Bowel sounds are normal. There is no distension. Palpations: Abdomen is soft. Tenderness: There is no abdominal tenderness. There is no guarding. Musculoskeletal:      Cervical back: Normal range of motion and neck supple. Right lower leg: No edema. Left lower leg: No edema. Skin:     General: Skin is warm and dry. Neurological:      General: No focal deficit present. Mental Status: He is alert and oriented to person, place, and time. Psychiatric:         Mood and Affect: Mood normal.         Behavior: Behavior normal.         Discussion/Summary:    Persistent Atrial Flutter: for ablation with EP. Continue med rx. Hypertension:  BP is uncontrolled. We will try changing lisinopril to candesartan.            The patient was counseled regarding diagnostic results, instructions for management, risk factor reductions, impressions. total time of encounter was 25 minutes and 15 minutes was spent counseling.

## 2023-08-29 ENCOUNTER — ANESTHESIA EVENT (OUTPATIENT)
Dept: NON INVASIVE DIAGNOSTICS | Facility: HOSPITAL | Age: 68
End: 2023-08-29
Payer: MEDICARE

## 2023-08-29 ENCOUNTER — HOSPITAL ENCOUNTER (OUTPATIENT)
Facility: HOSPITAL | Age: 68
Setting detail: OUTPATIENT SURGERY
Discharge: HOME | End: 2023-08-30
Attending: INTERNAL MEDICINE | Admitting: INTERNAL MEDICINE
Payer: MEDICARE

## 2023-08-29 ENCOUNTER — ANESTHESIA (OUTPATIENT)
Dept: NON INVASIVE DIAGNOSTICS | Facility: HOSPITAL | Age: 68
End: 2023-08-29
Payer: MEDICARE

## 2023-08-29 DIAGNOSIS — I48.92 ATRIAL FLUTTER, UNSPECIFIED TYPE (HCC): ICD-10-CM

## 2023-08-29 DIAGNOSIS — R00.1 BRADYCARDIA: Primary | ICD-10-CM

## 2023-08-29 LAB
ANION GAP SERPL CALCULATED.3IONS-SCNC: 7 MMOL/L
ATRIAL RATE: 267 BPM
ATRIAL RATE: 58 BPM
BUN SERPL-MCNC: 24 MG/DL (ref 5–25)
CALCIUM SERPL-MCNC: 9.8 MG/DL (ref 8.4–10.2)
CHLORIDE SERPL-SCNC: 104 MMOL/L (ref 96–108)
CO2 SERPL-SCNC: 29 MMOL/L (ref 21–32)
CREAT SERPL-MCNC: 1.08 MG/DL (ref 0.6–1.3)
ERYTHROCYTE [DISTWIDTH] IN BLOOD BY AUTOMATED COUNT: 13.5 % (ref 11.6–15.1)
GFR SERPL CREATININE-BSD FRML MDRD: 69 ML/MIN/1.73SQ M
GLUCOSE P FAST SERPL-MCNC: 98 MG/DL (ref 65–99)
GLUCOSE SERPL-MCNC: 98 MG/DL (ref 65–140)
HCT VFR BLD AUTO: 47.7 % (ref 36.5–49.3)
HGB BLD-MCNC: 14.8 G/DL (ref 12–17)
INR PPP: 1.2 (ref 0.84–1.19)
MCH RBC QN AUTO: 29.1 PG (ref 26.8–34.3)
MCHC RBC AUTO-ENTMCNC: 31 G/DL (ref 31.4–37.4)
MCV RBC AUTO: 94 FL (ref 82–98)
P AXIS: 39 DEGREES
P AXIS: 68 DEGREES
PLATELET # BLD AUTO: 217 THOUSANDS/UL (ref 149–390)
PMV BLD AUTO: 11.8 FL (ref 8.9–12.7)
POTASSIUM SERPL-SCNC: 3.6 MMOL/L (ref 3.5–5.3)
PROTHROMBIN TIME: 15.5 SECONDS (ref 11.6–14.5)
QRS AXIS: 21 DEGREES
QRS AXIS: 6 DEGREES
QRSD INTERVAL: 104 MS
QRSD INTERVAL: 96 MS
QT INTERVAL: 514 MS
QT INTERVAL: 552 MS
QTC INTERVAL: 421 MS
QTC INTERVAL: 434 MS
RBC # BLD AUTO: 5.08 MILLION/UL (ref 3.88–5.62)
SODIUM SERPL-SCNC: 140 MMOL/L (ref 135–147)
T WAVE AXIS: 66 DEGREES
T WAVE AXIS: 69 DEGREES
VENTRICULAR RATE: 35 BPM
VENTRICULAR RATE: 43 BPM
WBC # BLD AUTO: 6.82 THOUSAND/UL (ref 4.31–10.16)

## 2023-08-29 PROCEDURE — 85610 PROTHROMBIN TIME: CPT | Performed by: PHYSICIAN ASSISTANT

## 2023-08-29 PROCEDURE — 93653 COMPRE EP EVAL TX SVT: CPT | Performed by: INTERNAL MEDICINE

## 2023-08-29 PROCEDURE — C1893 INTRO/SHEATH, FIXED,NON-PEEL: HCPCS | Performed by: INTERNAL MEDICINE

## 2023-08-29 PROCEDURE — 85027 COMPLETE CBC AUTOMATED: CPT | Performed by: PHYSICIAN ASSISTANT

## 2023-08-29 PROCEDURE — 80048 BASIC METABOLIC PNL TOTAL CA: CPT | Performed by: PHYSICIAN ASSISTANT

## 2023-08-29 PROCEDURE — NC001 PR NO CHARGE: Performed by: PHYSICIAN ASSISTANT

## 2023-08-29 PROCEDURE — C1730 CATH, EP, 19 OR FEW ELECT: HCPCS | Performed by: INTERNAL MEDICINE

## 2023-08-29 PROCEDURE — C1894 INTRO/SHEATH, NON-LASER: HCPCS | Performed by: INTERNAL MEDICINE

## 2023-08-29 PROCEDURE — 76937 US GUIDE VASCULAR ACCESS: CPT | Performed by: INTERNAL MEDICINE

## 2023-08-29 PROCEDURE — 93010 ELECTROCARDIOGRAM REPORT: CPT | Performed by: INTERNAL MEDICINE

## 2023-08-29 PROCEDURE — C2630 CATH, EP, COOL-TIP: HCPCS | Performed by: INTERNAL MEDICINE

## 2023-08-29 PROCEDURE — 93005 ELECTROCARDIOGRAM TRACING: CPT

## 2023-08-29 RX ORDER — QUETIAPINE FUMARATE 25 MG/1
50 TABLET, FILM COATED ORAL
Status: DISCONTINUED | OUTPATIENT
Start: 2023-08-29 | End: 2023-08-30 | Stop reason: HOSPADM

## 2023-08-29 RX ORDER — SODIUM CHLORIDE 9 MG/ML
INJECTION, SOLUTION INTRAVENOUS CONTINUOUS PRN
Status: DISCONTINUED | OUTPATIENT
Start: 2023-08-29 | End: 2023-08-29

## 2023-08-29 RX ORDER — HYDROCHLOROTHIAZIDE 12.5 MG/1
12.5 TABLET ORAL DAILY
Status: DISCONTINUED | OUTPATIENT
Start: 2023-08-29 | End: 2023-08-30 | Stop reason: HOSPADM

## 2023-08-29 RX ORDER — ACETAMINOPHEN 325 MG/1
650 TABLET ORAL EVERY 4 HOURS PRN
Status: DISCONTINUED | OUTPATIENT
Start: 2023-08-29 | End: 2023-08-30 | Stop reason: HOSPADM

## 2023-08-29 RX ORDER — LEVOTHYROXINE SODIUM 0.07 MG/1
150 TABLET ORAL
Status: DISCONTINUED | OUTPATIENT
Start: 2023-08-30 | End: 2023-08-30 | Stop reason: HOSPADM

## 2023-08-29 RX ORDER — PRAVASTATIN SODIUM 40 MG
40 TABLET ORAL
Status: DISCONTINUED | OUTPATIENT
Start: 2023-08-29 | End: 2023-08-30 | Stop reason: HOSPADM

## 2023-08-29 RX ORDER — LIDOCAINE HYDROCHLORIDE 10 MG/ML
INJECTION, SOLUTION EPIDURAL; INFILTRATION; INTRACAUDAL; PERINEURAL AS NEEDED
Status: DISCONTINUED | OUTPATIENT
Start: 2023-08-29 | End: 2023-08-29

## 2023-08-29 RX ORDER — PANTOPRAZOLE SODIUM 40 MG/1
40 TABLET, DELAYED RELEASE ORAL
Status: DISCONTINUED | OUTPATIENT
Start: 2023-08-30 | End: 2023-08-30 | Stop reason: HOSPADM

## 2023-08-29 RX ORDER — LIDOCAINE HYDROCHLORIDE 10 MG/ML
INJECTION, SOLUTION EPIDURAL; INFILTRATION; INTRACAUDAL; PERINEURAL CODE/TRAUMA/SEDATION MEDICATION
Status: DISCONTINUED | OUTPATIENT
Start: 2023-08-29 | End: 2023-08-29 | Stop reason: HOSPADM

## 2023-08-29 RX ORDER — FINASTERIDE 5 MG/1
5 TABLET, FILM COATED ORAL DAILY
Status: DISCONTINUED | OUTPATIENT
Start: 2023-08-29 | End: 2023-08-30 | Stop reason: HOSPADM

## 2023-08-29 RX ORDER — ONDANSETRON 2 MG/ML
INJECTION INTRAMUSCULAR; INTRAVENOUS AS NEEDED
Status: DISCONTINUED | OUTPATIENT
Start: 2023-08-29 | End: 2023-08-29

## 2023-08-29 RX ORDER — FENTANYL CITRATE 50 UG/ML
INJECTION, SOLUTION INTRAMUSCULAR; INTRAVENOUS AS NEEDED
Status: DISCONTINUED | OUTPATIENT
Start: 2023-08-29 | End: 2023-08-29

## 2023-08-29 RX ORDER — PANTOPRAZOLE SODIUM 40 MG/10ML
40 INJECTION, POWDER, LYOPHILIZED, FOR SOLUTION INTRAVENOUS ONCE
Status: DISCONTINUED | OUTPATIENT
Start: 2023-08-29 | End: 2023-08-29 | Stop reason: HOSPADM

## 2023-08-29 RX ORDER — PROPOFOL 10 MG/ML
INJECTION, EMULSION INTRAVENOUS AS NEEDED
Status: DISCONTINUED | OUTPATIENT
Start: 2023-08-29 | End: 2023-08-29

## 2023-08-29 RX ORDER — LOSARTAN POTASSIUM 50 MG/1
100 TABLET ORAL DAILY
Status: DISCONTINUED | OUTPATIENT
Start: 2023-08-29 | End: 2023-08-30 | Stop reason: HOSPADM

## 2023-08-29 RX ORDER — HEPARIN SODIUM 1000 [USP'U]/ML
INJECTION, SOLUTION INTRAVENOUS; SUBCUTANEOUS CODE/TRAUMA/SEDATION MEDICATION
Status: DISCONTINUED | OUTPATIENT
Start: 2023-08-29 | End: 2023-08-29 | Stop reason: HOSPADM

## 2023-08-29 RX ADMIN — LOSARTAN POTASSIUM 100 MG: 50 TABLET, FILM COATED ORAL at 15:40

## 2023-08-29 RX ADMIN — ONDANSETRON 4 MG: 2 INJECTION INTRAMUSCULAR; INTRAVENOUS at 09:49

## 2023-08-29 RX ADMIN — PROPOFOL 40 MG: 10 INJECTION, EMULSION INTRAVENOUS at 08:28

## 2023-08-29 RX ADMIN — QUETIAPINE FUMARATE 50 MG: 25 TABLET ORAL at 21:10

## 2023-08-29 RX ADMIN — NOREPINEPHRINE BITARTRATE 2 MCG/MIN: 1 INJECTION, SOLUTION, CONCENTRATE INTRAVENOUS at 08:59

## 2023-08-29 RX ADMIN — FENTANYL CITRATE 25 MCG: 50 INJECTION INTRAMUSCULAR; INTRAVENOUS at 08:28

## 2023-08-29 RX ADMIN — PHENYLEPHRINE HYDROCHLORIDE 50 MCG/MIN: 10 INJECTION INTRAVENOUS at 08:36

## 2023-08-29 RX ADMIN — RIVAROXABAN 20 MG: 20 TABLET, FILM COATED ORAL at 15:40

## 2023-08-29 RX ADMIN — SODIUM CHLORIDE: 0.9 INJECTION, SOLUTION INTRAVENOUS at 08:09

## 2023-08-29 RX ADMIN — HYDROCHLOROTHIAZIDE 12.5 MG: 12.5 TABLET ORAL at 15:41

## 2023-08-29 RX ADMIN — PROPOFOL 120 MCG/KG/MIN: 10 INJECTION, EMULSION INTRAVENOUS at 08:29

## 2023-08-29 RX ADMIN — LIDOCAINE HYDROCHLORIDE 40 MG: 10 INJECTION, SOLUTION EPIDURAL; INFILTRATION; INTRACAUDAL at 08:28

## 2023-08-29 NOTE — ANESTHESIA POSTPROCEDURE EVALUATION
Post-Op Assessment Note    CV Status:  Stable    Pain management: adequate     Mental Status:  Awake and sleepy   Hydration Status:  Euvolemic   PONV Controlled:  Controlled   Airway Patency:  Patent      Post Op Vitals Reviewed: Yes      Staff: CRNA         No notable events documented.     BP  151/72   Temp     Pulse 41   Resp   15   SpO2   96

## 2023-08-29 NOTE — H&P
H&P Exam - Electrophysiology - Cardiology   Clarinda Leventhal. 70 y.o. male MRN: [id number]  Unit/Bed#: BE CATH LAB ROOM Encounter: [id number]    Assessment/Plan     Assessment:  1. Atrial flutter  a. Noted rhythm of atrial flutter with slow response down during colonoscopy in March 2023  b. Anticoagulated with Xarelto last dose yesterday morning  c. Not on AV araseli blockers given #2  d. EF 6/5/2020 355 to 60% LA severely dilated  e. EKG this morning with flutter  2. Bradycardia   a. With slow ventricular rates averaging 50s to 60s  b. Holter average rates 41 and going as low as 30s however patient is asymptomatic  c. Discussed pacemaker, however unsure if patient will require  d. Looks like some component of chronotropic incompetence given patient is unable to elevate heart rates greater than 50 on treadmill  3. HTN   4. Hypothyroid   5. HLD     Plan:  -Plan for atrial flutter ablation today with Dr. Sara Bull. We will continue Xarelto after procedure and likely discharge later on today. As of right now, no plans for permanent pacemaker, however will have patient follow-up for discussion down the road. History of Present Illness   HPI:  Clarinda Leventhal. is a 70y.o. year old male with Interim history: Clarinda Leventhal. is a 70 y.o. male with history of hypertension, hyperlipidemia, hypothyroidism, GERD. He presents today for consultation for new atrial flutter with slow ventricular response.     Briefly, patient's history began in March of this year when he was undergoing testing for colonoscopy.   He was found to have significantly elevated blood pressures on initial consultation for colonoscopy and was sent to the emergency department where he is found to be in 8-1 atrial flutter with slow ventricular response.     Patient does follow with outside medical service and records, especially prior EKGs are unavailable at this time, however patient does state that his heart rate is usually on the lower side in the 46s and 60s. He states that recently his heart rates have been in the 40s and go as low as the 30s however he is relatively asymptomatic.      Patient was sent to Dr. Deven Sunshine for consultation and found to be in atrial flutter with slow ventricular response. He was started on anticoagulation with Xarelto which he states is very expensive and is currently using samples to maintain anticoagulation status.     Patient was referred to electrophysiology by Dr. Kevin Howe for discussion of possible atrial flutter ablation. Patient did have outpatient Holter monitor done which did show average heart rate of 41 going as low as 30s.     Patient has remained relatively active and states that he is still going to the gym and will get on the treadmill however he is unable to get his heart rates over 50s even while on the treadmill. He states that he does not get any symptoms including lightheadedness, chest pain, dizziness, shortness of breath, near syncope even with his low heart rates.     Dr. Angel Tidwell and myself both discussed atrial flutter ablation with this patient and possible need for permanent pacemaker insertion as we are unsure of patient's underlying rhythm. We also discussed that his hypertension although significantly elevated is likely a compensatory response given his bradycardia.     Discussed in detail procedures and recovery periods for both flutter ablation as well as possible permanent pacemaker insertion. Patient no questions at the end of consultation and stated that he would like to discuss with his family and do a little bit more research before making final decision on whether this procedure would be right for him especially in the setting of him not having symptoms. EKG: typical atrial flutter ventricular rate 43 bpm.     Review of Systems   Constitutional: Negative for fatigue and fever. Respiratory: Negative for chest tightness and shortness of breath.     Cardiovascular: Negative for chest pain and palpitations. All other systems reviewed and are negative. ROS as noted above, otherwise 12 point review of systems was performed and is negative. Historical Information   Past Medical History:   Diagnosis Date   • Colon polyp    • GERD (gastroesophageal reflux disease)    • Hyperlipidemia    • Seasonal allergies    • Thyroid disease      Past Surgical History:   Procedure Laterality Date   • ACHILLES TENDON REPAIR Left    • COLONOSCOPY       Family History:   Family History   Problem Relation Age of Onset   • Colon cancer Father    • Diabetes Brother        Social History   Social History     Substance and Sexual Activity   Alcohol Use Yes    Comment: occasional     Social History     Substance and Sexual Activity   Drug Use Never     Social History     Tobacco Use   Smoking Status Never   Smokeless Tobacco Never       Meds/Allergies   all medications and allergies reviewed  Home Medications:   Medications Prior to Admission   Medication   • candesartan (ATACAND) 32 MG tablet   • fexofenadine (ALLEGRA) 60 MG tablet   • finasteride (PROSCAR) 5 mg tablet   • hydrochlorothiazide (HYDRODIURIL) 12.5 mg tablet   • ibuprofen (MOTRIN) 200 mg tablet   • levothyroxine 150 mcg tablet   • Multiple Vitamins-Minerals (Mens 50+ Multivitamin) TABS   • omeprazole (PriLOSEC) 20 mg delayed release capsule   • oxymetazoline (AFRIN) 0.05 % nasal spray   • QUEtiapine (SEROquel) 50 mg tablet   • rivaroxaban (Xarelto) 20 mg tablet   • simvastatin (ZOCOR) 20 mg tablet   • albuterol (PROVENTIL HFA,VENTOLIN HFA) 90 mcg/act inhaler   • amoxicillin (AMOXIL) 500 mg capsule   • fluticasone (FLONASE) 50 mcg/act nasal spray       No Known Allergies    Objective   Vitals: Blood pressure (!) 208/99, pulse (!) 40, temperature (!) 97.2 °F (36.2 °C), temperature source Tympanic, resp. rate 16, SpO2 98 %.   Orthostatic Blood Pressures    Flowsheet Row Most Recent Value   Blood Pressure 208/99 filed at 08/29/2023 0700          No intake or output data in the 24 hours ending 08/29/23 0837    Invasive Devices     Peripheral Intravenous Line  Duration           Peripheral IV 08/29/23 Distal;Right;Upper;Ventral (anterior) Arm <1 day    Peripheral IV 08/29/23 Dorsal (posterior); Right Hand <1 day                Physical Exam  Vitals and nursing note reviewed. Constitutional:       General: He is not in acute distress. HENT:      Head: Normocephalic and atraumatic. Cardiovascular:      Rate and Rhythm: Regular rhythm. Bradycardia present. Pulmonary:      Effort: Pulmonary effort is normal.      Breath sounds: Normal breath sounds. Abdominal:      General: Abdomen is flat. Palpations: Abdomen is soft. Musculoskeletal:      Right lower leg: No edema. Left lower leg: No edema. Skin:     General: Skin is warm and dry. Neurological:      General: No focal deficit present. Mental Status: He is alert and oriented to person, place, and time. Psychiatric:         Mood and Affect: Mood normal.         Lab Results: I have personally reviewed pertinent lab results. Results from last 7 days   Lab Units 08/29/23  0641   WBC Thousand/uL 6.82   HEMOGLOBIN g/dL 14.8   HEMATOCRIT % 47.7   PLATELETS Thousands/uL 217     Results from last 7 days   Lab Units 08/29/23  0641   POTASSIUM mmol/L 3.6   CHLORIDE mmol/L 104   CO2 mmol/L 29   BUN mg/dL 24   CREATININE mg/dL 1.08   CALCIUM mg/dL 9.8     Results from last 7 days   Lab Units 08/29/23  0641   INR  1.20*             Imaging: I have personally reviewed pertinent reports. No results found for this or any previous visit. Code Status: Level 1 - Full Code    ** Please Note: Dictation voice to text software may have been used in the creation of this document.  **

## 2023-08-29 NOTE — DISCHARGE INSTR - AVS FIRST PAGE
MEDICATION INSTRUCTIONS/CHANGES:  Please continue to take Xarelto at least one month after your ablation. RESTRICTIONS:   No heavy lifting (more than 5-10 pounds) or strenuous activity for one week. No soaking in a bath tub/hot tub/swimming pool for one week or until groin heals. You may shower - please let soap and water run over the groins, no scrubbing, and pat the area dry. Please place band-aid on groins daily for up to five days, but you may remove sooner if no issues are noted. If you notice ongoing bleeding, swelling, or firm lumps in groin near ablation incision, please contact Dr. Bebeto Mclain office - [phone]. If you have any significant issues after hours or on the weekends, please call the on call cardiology number at [phone].

## 2023-08-29 NOTE — NURSING NOTE
Pt to cath labe [facility] for bed management s/p aflutter ablation. On monitor, EKG obtained. Dr. Ana Marquez evaluated patient. HR in the 30s. Patient 40s baseline. Will walk patient after bedrest and MD will make decision concerning pacemaker placement. Dressing clean and dry. Tolerating clear liquids. Wife at bedside. Report to [facility]. Transported by RN.

## 2023-08-29 NOTE — ANESTHESIA PREPROCEDURE EVALUATION
Procedure:  Cardiac eps/aflutter ablation (Chest)    Relevant Problems   CARDIO   (+) Hyperlipidemia      ENDO   (+) Hypothyroidism      GI/HEPATIC   (+) GERD (gastroesophageal reflux disease)        Physical Exam    Airway    Mallampati score: II  TM Distance: >3 FB  Neck ROM: full     Dental   No notable dental hx     Cardiovascular  Rhythm: irregular, Rate: normal,     Pulmonary  Pulmonary exam normal     Other Findings  Endorses recent cough and runny nose but clear to auscultation bilaterally at the moment       Anesthesia Plan  ASA Score- 2     Anesthesia Type- IV sedation with anesthesia with ASA Monitors. Additional Monitors:   Airway Plan:           Plan Factors-Exercise tolerance (METS): >4 METS. Chart reviewed. EKG reviewed. Imaging results reviewed. Existing labs reviewed. Patient summary reviewed. Patient is not a current smoker. Patient instructed to abstain from smoking on day of procedure. Patient did not smoke on day of surgery. Obstructive sleep apnea risk education given perioperatively. There is medical exclusion for perioperative obstructive sleep apnea risk education. Induction- intravenous. Postoperative Plan- Plan for postoperative opioid use. Informed Consent- Anesthetic plan and risks discussed with patient and spouse. I personally reviewed this patient with the CRNA. Discussed and agreed on the Anesthesia Plan with the CRNA. Mendez Speaker

## 2023-08-29 NOTE — QUICK NOTE
Post procedurally patient developed CHB with rates 30's but was asymptomatic. Later on tele appear to be wenckebach after patient was ambulated with heart rates going up to the 50's. He again did not feel symptoms of lightheadedness or dizziness and felt fine. Patient with baseline rates of 40-50's at home with some component of chronotropic incompetence as he is not able to get heart rates above 50 one treadmill. Patient is unsure about pacer but agreeable to stay overnight for observation. Keep patient npo at midnight in the event that a pacer is needed.

## 2023-08-30 ENCOUNTER — CLINICAL SUPPORT (OUTPATIENT)
Dept: CARDIOLOGY CLINIC | Facility: CLINIC | Age: 68
End: 2023-08-30
Payer: MEDICARE

## 2023-08-30 VITALS
HEART RATE: 45 BPM | RESPIRATION RATE: 18 BRPM | OXYGEN SATURATION: 94 % | TEMPERATURE: 98.4 F | DIASTOLIC BLOOD PRESSURE: 81 MMHG | SYSTOLIC BLOOD PRESSURE: 174 MMHG

## 2023-08-30 DIAGNOSIS — I48.92 ATRIAL FLUTTER, UNSPECIFIED TYPE (HCC): Primary | ICD-10-CM

## 2023-08-30 PROBLEM — Z98.890 STATUS POST ABLATION OF ATRIAL FLUTTER: Status: ACTIVE | Noted: 2023-08-30

## 2023-08-30 PROBLEM — Z86.79 STATUS POST ABLATION OF ATRIAL FLUTTER: Status: ACTIVE | Noted: 2023-08-30

## 2023-08-30 LAB
ANION GAP SERPL CALCULATED.3IONS-SCNC: 7 MMOL/L
ATRIAL RATE: 63 BPM
BUN SERPL-MCNC: 20 MG/DL (ref 5–25)
CALCIUM SERPL-MCNC: 8 MG/DL (ref 8.4–10.2)
CHLORIDE SERPL-SCNC: 108 MMOL/L (ref 96–108)
CO2 SERPL-SCNC: 25 MMOL/L (ref 21–32)
CREAT SERPL-MCNC: 0.94 MG/DL (ref 0.6–1.3)
ERYTHROCYTE [DISTWIDTH] IN BLOOD BY AUTOMATED COUNT: 13.9 % (ref 11.6–15.1)
GFR SERPL CREATININE-BSD FRML MDRD: 82 ML/MIN/1.73SQ M
GLUCOSE P FAST SERPL-MCNC: 89 MG/DL (ref 65–99)
GLUCOSE SERPL-MCNC: 89 MG/DL (ref 65–140)
HCT VFR BLD AUTO: 37.1 % (ref 36.5–49.3)
HGB BLD-MCNC: 11.8 G/DL (ref 12–17)
MCH RBC QN AUTO: 29.7 PG (ref 26.8–34.3)
MCHC RBC AUTO-ENTMCNC: 31.8 G/DL (ref 31.4–37.4)
MCV RBC AUTO: 94 FL (ref 82–98)
P AXIS: 79 DEGREES
PLATELET # BLD AUTO: 169 THOUSANDS/UL (ref 149–390)
PMV BLD AUTO: 12.2 FL (ref 8.9–12.7)
POTASSIUM SERPL-SCNC: 3.9 MMOL/L (ref 3.5–5.3)
QRS AXIS: 6 DEGREES
QRSD INTERVAL: 108 MS
QT INTERVAL: 520 MS
QTC INTERVAL: 439 MS
RBC # BLD AUTO: 3.97 MILLION/UL (ref 3.88–5.62)
SODIUM SERPL-SCNC: 140 MMOL/L (ref 135–147)
T WAVE AXIS: 71 DEGREES
VENTRICULAR RATE: 43 BPM
WBC # BLD AUTO: 7.73 THOUSAND/UL (ref 4.31–10.16)

## 2023-08-30 PROCEDURE — 85027 COMPLETE CBC AUTOMATED: CPT | Performed by: PHYSICIAN ASSISTANT

## 2023-08-30 PROCEDURE — 80048 BASIC METABOLIC PNL TOTAL CA: CPT | Performed by: PHYSICIAN ASSISTANT

## 2023-08-30 PROCEDURE — 99211 OFF/OP EST MAY X REQ PHY/QHP: CPT

## 2023-08-30 PROCEDURE — NC001 PR NO CHARGE: Performed by: PHYSICIAN ASSISTANT

## 2023-08-30 PROCEDURE — 93005 ELECTROCARDIOGRAM TRACING: CPT

## 2023-08-30 PROCEDURE — 93010 ELECTROCARDIOGRAM REPORT: CPT | Performed by: INTERNAL MEDICINE

## 2023-08-30 RX ADMIN — FINASTERIDE 5 MG: 5 TABLET, FILM COATED ORAL at 12:13

## 2023-08-30 RX ADMIN — LOSARTAN POTASSIUM 100 MG: 50 TABLET, FILM COATED ORAL at 12:12

## 2023-08-30 RX ADMIN — LEVOTHYROXINE SODIUM 150 MCG: 75 TABLET ORAL at 06:11

## 2023-08-30 RX ADMIN — RIVAROXABAN 20 MG: 20 TABLET, FILM COATED ORAL at 06:11

## 2023-08-30 RX ADMIN — HYDROCHLOROTHIAZIDE 12.5 MG: 12.5 TABLET ORAL at 12:12

## 2023-08-30 RX ADMIN — PANTOPRAZOLE SODIUM 40 MG: 40 TABLET, DELAYED RELEASE ORAL at 06:11

## 2023-08-30 NOTE — DISCHARGE SUMMARY
Discharge Summary - Shea Wang 70 y.o. male MRN: [id number]    Unit/Bed#: CW2 210-01 Encounter: [id number]      Admission Date: 8/29/2023   Discharge Date: 8/30/2023    Discharge Diagnosis:   1. Typical flutter  - anticoagulated with Xarelto / [id number] score of 2 (age, HTN)  - EF of 55-60% per echo 6/2023 / severe dilation LA noted  - rate control: none, see #2  - antiarrhythmic therapy: none  - status post ablation of typical flutter with CTI line by Dr. Yung Mi on 8/29/2023  2. AV araseli disease  - did have complete heart block immediately post op, improved to [facility] with ambulation, continues to have POD1 what appears to be Mobitz type I with blocked PAC's  3. Essential hypertension  4. Hyperlipidemia  5. Hypothyroidism    Procedures Performed:   Orders Placed This Encounter   Procedures   • Cardiac ep lab eps/ablations     Consultants: None    HPI: Please refer to the initial history and physical as well as procedure notes for full details. Briefly, Shea Wang is a 70y.o. year old male with past medical history as stated above. He was seen by Dr. Yung Mi as an outpatient, and ablation was recommended. He presented this hospital admission to undergo this procedure. Hospital Course: Shea Wang presented at his baseline state of health. After the procedure was explained in detail and consent was obtained, he underwent ablation of aflutter without complications. He tolerated the procedure well. Postop, it was found that he had a complete heart block which after a couple hours did improve to [employer] with ambulation. It was felt that he would be better served by staying overnight to ensure that he did not require a pacemaker. He was then monitored overnight for further observation. There were no acute issues or events overnight. The following morning He denied all cardiac complaints, including chest pain/pressure, dyspnea, palpitations, dizziness, lightheadedness, or syncope. His vital signs were reviewed and labs are stable. Telemetry showed heart rate still in the 40s but improving to the low 50s. When he was walked, it was found that he still did have Suzi Michael with blocked PACs but overall he felt well. Patient reported that even when he was in flutter with heart rates in the 40s that he was still able to go about his daily life with no lightheadedness, dizziness, exertional dyspnea, or fatigue. His groins were soft without significant hematoma or recurrent bleeding. Physical exam on the day of discharge was as follows:  GEN: NAD, alert and oriented, well appearing  SKIN: dry without significant lesions or rashes  HEENT: NCAT, PERRL, EOMs intact  NECK: No JVD appreciated  CARDIOVASCULAR: irregular, bradycardic  LUNGS: Good respiratory effort with no audible wheezes  PSYCH: Normal mood and affect  NEURO: CN ll-Xll grossly intact      He was given routine post ablation discharge instructions and restrictions, and these were explained in detail. He was given a follow up appointment with Shanique Foreman PA-C, and he was instructed to follow up with his primary cardiologist as previously instructed. In terms of his medications, he will be continued on Xarelto for at least 1 month in the post ablation setting which he will  at the office when he is also being placed for a 2-week live ZIO monitor. He is stable for discharge at this time with all questions answered. He was discussed in detail with Dasha Hansen and Markus Murdock who is in agreement with this discharge summary. Discharge Medications:  See after visit summary for reconciled discharge medications provided to patient and family.     Medications Prior to Admission   Medication   • candesartan (ATACAND) 32 MG tablet   • fexofenadine (ALLEGRA) 60 MG tablet   • finasteride (PROSCAR) 5 mg tablet   • hydrochlorothiazide (HYDRODIURIL) 12.5 mg tablet   • ibuprofen (MOTRIN) 200 mg tablet   • levothyroxine 150 mcg tablet   • Multiple Vitamins-Minerals (Mens 50+ Multivitamin) TABS   • omeprazole (PriLOSEC) 20 mg delayed release capsule   • oxymetazoline (AFRIN) 0.05 % nasal spray   • QUEtiapine (SEROquel) 50 mg tablet   • rivaroxaban (Xarelto) 20 mg tablet   • simvastatin (ZOCOR) 20 mg tablet   • albuterol (PROVENTIL HFA,VENTOLIN HFA) 90 mcg/act inhaler   • amoxicillin (AMOXIL) 500 mg capsule   • fluticasone (FLONASE) 50 mcg/act nasal spray         Pertininet Labs/diagnostics:  CBC with diff:   Results from last 7 days   Lab Units 08/30/23  0606 08/29/23  0641   WBC Thousand/uL 7.73 6.82   HEMOGLOBIN g/dL 11.8* 14.8   HEMATOCRIT % 37.1 47.7   MCV fL 94 94   PLATELETS Thousands/uL 169 217   RBC Million/uL 3.97 5.08   MCH pg 29.7 29.1   MCHC g/dL 31.8 31.0*   RDW % 13.9 13.5   MPV fL 12.2 11.8       BMP:  Results from last 7 days   Lab Units 08/30/23  0606 08/29/23  0641   POTASSIUM mmol/L 3.9 3.6   CHLORIDE mmol/L 108 104   CO2 mmol/L 25 29   BUN mg/dL 20 24   CREATININE mg/dL 0.94 1.08   CALCIUM mg/dL 8.0* 9.8       Magnesium:       Coags:   Results from last 7 days   Lab Units 08/29/23  0641   INR  2.93*         Complications: none    Condition at Discharge: good     Discharge instructions/Information to patient and family:   See after visit summary for information provided to patient and family. Provisions for Follow-Up Care:  See after visit summary for information related to follow-up care and any pertinent home health orders. Disposition: Home    Planned Readmission: No    Discharge Statement   I spent 45 minutes minutes discharging the patient. This time was spent on the day of discharge. I had direct contact with the patient on the day of discharge. Additional documentation is required if more than 30 minutes were spent on discharge.  Evaluating the incision, discussing discharge instructions and restrictions, arranging follow up appointments, discussing medications

## 2023-08-30 NOTE — PROGRESS NOTES
Patient presents to the office under the direction of Quang for a 2 week Zio AT. Patch applied and all instructions given. Patient verbalized understanding. Subjective:  Yadira Jones is a 34 year old  female at 37w2d who presents with Uterine contractions.  Her current pregnancy is significant forheterozygous for prothrombin gene mutation  .  Patient reports regular fetal movement.       Patient Active Problem List    Diagnosis Date Noted   • Vaginal discharge during pregnancy in third trimester 2023     Priority: Low   • Thrombophilia (CMD) 2023     Priority: Low     - Additional expanded/directed carrier screening: + for carrier of the prothrombin gene mutation with no history of clots and alkaptonuria (declined partner testing).  Carrier of Alkaptonuria: declined partner testing  Carrier of prothrombin gene mutation, no hx of clot: Mutation: v.*97G>A (in F2 gene)  Inheritance: Autosomal DOMINANT; no anticoagulation in the first pregnancy or postpartum  - S/p MFM consult with Dr. Oshea in prior pregnancy on 21: In the absence of other major risk factors for VTE, patients who are heterozygous for prothrombin gene mutation and have no personal history of thromboembolism, the risk for VTE remains 0.4-2.6% during pregnancy. \"Postpartum prophylaxis is necessary in asymptomatic women with lower-risk thrombophilias and no personal history of venous thromboembolic disease (VTE).  - Because the risk of postpartum thrombosis in the absence of other risk factors is quite low, ACOG suggests surveillance without anticoagulation therapy or initiating prophylaxis postpartum if there are additional risk factors for thrombosis (e.g.,  delivery, obesity, prolonged immobility, first degree family member with VTE before age 50, preeclampsia).  \"  - on daily baby ASA     • History of cervical dysplasia 2023     Priority: Low     - History of CIN2 no LEEP. Needs q3yr for 20years after dx (). Had CIN1 in , last Pap normal in . Due in 2024     • Rh negative state in antepartum period 2023     Priority: Low     - rhogam given at  28 weeks     • HSV infection 06/16/2023     Priority: Low     Increase dose to 500 mg BID at 36 weeks     • History of gestational diabetes 06/16/2023     Priority: Low     - S/P normal baseline glycohemoglobin  - early one hour GTT borderline (136)  - one hour GTT elevated.   - 3 hour gtt normal.      • Prenatal care, antepartum 05/02/2023     Priority: Low     - A-/antibody screen negative/RI/RPR NR/HB-/HC-/HIV-/Hgb 12.5/Plt 216; baseline HgbA1C 4.9%  - Pap smear (4/27/21) --> negative  - GC/C/Trich testing not completed  - UCx negative  - MT 21 negative, female fetus  - S/P prior carrier screening which noted that patient is a carrier of the prothrombin gene mutation and alkaptonuria  - AFP negative  - Anatomy U/S (5/2/23) --> normal fluid, anterior placenta,  g - 81%, AC 73%, female fetus, no structural abnormalities, normal cervical length 3.5 cm  - Growth U/S- EFW 4lb 7oz (57%), AC 53%  - 28 wk labs completed. Elevated 1 hour. Normal 3 hour.   - COVID vaccinated  - S/P completed HPV vaccination  - Tdap vaccination complete  - Rhogam complete  - Female fetus, no name as of yet  - Breastfeeding  - Condoms contraception  - GBS pending     • Anxiety in pregnancy, antepartum 05/02/2023     Priority: Low     - pt stressed due to social issues (moving, in school) but she reports feeling emotionally stable; pt states that she has good support at home  - current therapy: none  - current medications: none for the past 15 years     • Elderly multigravida with antepartum condition or complication 05/02/2023     Priority: Low     - S/P MT21 negative, female fetus  - AFP negative  - Anatomy U/S normal  - baby ASA  - Growth U/S at 32 complete  - Once weekly NST starting at 36 wks- declines  - IOL 39-40 wks     • 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  \"  - Rx baby ASA sent        • Hallux valgus (acquired) 10/14/2012     Priority: Low   • Panic disorder      Priority: Low       Past Medical History:   Diagnosis Date   • Cervical intraepithelial neoplasia grade 2 2013    Declined LEEP, then repap  normal   • COVID-19 05/10/2021   • Dysplasia of cervix, low grade (JOANNE 1)    • Gestational diabetes 2021   • Panic disorder    • Rh negative state in antepartum period      Past Surgical History:   Procedure Laterality Date   • Lockhart tooth extraction       SOCIAL HISTORY:   Social History     Tobacco Use   • Smoking status: Former     Current packs/day: 0.00     Types: Cigarettes     Quit date: 2015     Years since quittin.2   • Smokeless tobacco: Never   Substance Use Topics   • Alcohol use: Not Currently     Alcohol/week: 0.0 standard drinks of alcohol     Comment: occ       Sexually Active: Yes             Partners with: Male      Drug Use:    No              Review of patient's social economics indicates:   Student               Family History   Problem Relation Age of Onset   • Arrhythmia Mother         needs a defibrillator   • Kidney Transplant Maternal Grandfather    • Kidney disease Paternal Grandmother    • Heart Paternal Grandfather         heart transplant       No results found for this or any previous visit.      GBS:   CULTURE, STREPTOCOCCUS GROUP B WITH SENSITIVITIES (PENICILLIN ALLERGIC)   Date Value Ref Range Status   2023   Final    Negative for  Streptococcus agalactiae (Strep group B)      Rubella Antibody, IgG (Units/mL)   Date Value   2023 433.5       Blood type  No  results found for: \"ABR\", \"ABSCT\"    Rubella Antibody, IgG (Units/mL)   Date Value   2023 433.5       Recent Labs   Lab 23  0442   WBC 10.3   RBC 3.57*   HGB 10.8*   HCT 31.7*       Objective:  General: alert and cooperative   Heart: Regular rate and rhythm  Lungs: Clear  FHT: Category 1  Estimated Fetal Weight: 7lb 0oz  Cervix per Dr Ray   Dilation: 4cm   Effacement: 50%   Station: -2   Consistency: medium   Position: posterior  Extremities: Non tender, no edema    Assessment:   >Gestational Age: 37w2d  >Reassuring FHTs  >Heterozygous for Prothrombin Gene Mutation - no postpartum anticoagulation  In postpartum period with first pregnancy  >SROM at 0230  >A Rh positive  >GBS - NEG  > Hx Genital Herpes - has been taking valtrex prophylaxis    Plan:  Admit   Expect normal spontaneous vaginal delivery -   Pt plans \"natural delivery\".  Would like to hold off on Pitocin    > Per Dr Armendariz 21 Consult   Because the risk of postpartum thrombosis in the absence of other risk factors is quite low, ACOG suggests surveillance without anticoagulation therapy or initiating prophylaxis postpartum if there are additional risk factors for thrombosis (e.g.,  delivery, obesity, prolonged immobility, first degree family member with VTE before age 50, preeclampsia).  Liz Espinoza MD  2023  6:08 AM

## 2023-08-30 NOTE — PLAN OF CARE
Problem: PAIN - ADULT  Goal: Verbalizes/displays adequate comfort level or baseline comfort level  Description: Interventions:  - Encourage patient to monitor pain and request assistance  - Assess pain using appropriate pain scale  - Administer analgesics based on type and severity of pain and evaluate response  - Implement non-pharmacological measures as appropriate and evaluate response  - Consider cultural and social influences on pain and pain management  - Notify physician/advanced practitioner if interventions unsuccessful or patient reports new pain  Outcome: Progressing     Problem: INFECTION - ADULT  Goal: Absence or prevention of progression during hospitalization  Description: INTERVENTIONS:  - Assess and monitor for signs and symptoms of infection  - Monitor lab/diagnostic results  - Monitor all insertion sites, i.e. indwelling lines, tubes, and drains  - Monitor endotracheal if appropriate and nasal secretions for changes in amount and color  - [place] appropriate cooling/warming therapies per order  - Administer medications as ordered  - Instruct and encourage patient and family to use good hand hygiene technique  - Identify and instruct in appropriate isolation precautions for identified infection/condition  Outcome: Progressing  Goal: Absence of fever/infection during neutropenic period  Description: INTERVENTIONS:  - Monitor WBC    Outcome: Progressing     Problem: Knowledge Deficit  Goal: Patient/family/caregiver demonstrates understanding of disease process, treatment plan, medications, and discharge instructions  Description: Complete learning assessment and assess knowledge base.   Interventions:  - Provide teaching at level of understanding  - Provide teaching via preferred learning methods  Outcome: Progressing

## 2023-08-30 NOTE — CASE MANAGEMENT
Case Management Assessment & Discharge Planning Note    Patient name Adam Winter. Location CW2 210/CW2 210- MRN [id number]  : 1955 Date 2023       Current Admission Date: 2023  Current Admission Diagnosis:Atrial flutter [facility])   Patient Active Problem List    Diagnosis Date Noted   • Atrial flutter ([address]) 2023   • GERD (gastroesophageal reflux disease)    • Hyperlipidemia    • Seasonal allergies    • Hypothyroidism    • Prostate hypertrophy       LOS (days): 0  Geometric Mean LOS (GMLOS) (days):   Days to GMLOS:     OBJECTIVE:              Current admission status: Outpatient Surgery       Preferred Pharmacy:   [id number], [address] [place] [address]  Phone: [phone] Fax: [phone]    Primary Care Provider: Belem Serna MD    Primary Insurance: MEDICARE  Secondary Insurance: AETNA    ASSESSMENT:  Active Health Care Proxies    There are no active Health Care Proxies on file. Advance Directives  Does patient have a [facility]?: No  Does patient have Advance Directives?: Yes  Advance Directives: Living will  Primary Contact: wife Mera              Patient Information  Mental Status: Alert  During Assessment patient was accompanied by: Spouse  Assessment information provided by[de-identified] Patient  Primary Caregiver: Self  Support Systems: Spouse/significant other  Home entry access options.  Select all that apply.: Stairs  Number of steps to enter home.: 2  Type of Current Residence: 2 story home  Upon entering residence, is there a bedroom on the main floor (no further steps)?: No  A bedroom is located on the following floor levels of residence (select all that apply):: 2nd Floor  Upon entering residence, is there a bathroom on the main floor (no further steps)?: Yes  Number of steps to 2nd floor from main floor: One Flight  In the last 12 months, was there a time when you were not able to pay the mortgage or rent on time?: No  In the last 12 months, how many places have you lived?: 1  In the last 12 months, was there a time when you did not have a steady place to sleep or slept in a shelter (including now)?: No  Living Arrangements: Lives w/ Spouse/significant other    Activities of Daily Living Prior to Admission  Functional Status: Independent  Completes ADLs independently?: Yes  Ambulates independently?: Yes  Does patient use assisted devices?: No  Does patient currently own DME?: No  Does patient have a history of Outpatient Therapy (PT/OT)?: No  Does the patient have a history of Short-Term Rehab?: No  Does patient have a history of HHC?: No         Patient Information Continued  Income Source: Pension/detention  Does patient have prescription coverage?: Yes  Within the past 12 months, you worried that your food would run out before you got the money to buy more.: Never true  Within the past 12 months, the food you bought just didn't last and you didn't have money to get more.: Never true  Does patient receive dialysis treatments?: No  Does patient have a history of substance abuse?: No  Does patient have a history of Mental Health Diagnosis?: No         Means of Transportation  Means of Transport to Appts[de-identified] Drives Self  In the past 12 months, has lack of transportation kept you from medical appointments or from getting medications?: No  In the past 12 months, has lack of transportation kept you from meetings, work, or from getting things needed for daily living?: No        DISCHARGE DETAILS:    Discharge planning discussed with[de-identified] pt and wife  Freedom of Choice: Yes                   Contacts  Patient Contacts: wife Ade Mendez  Relationship to Patient[de-identified] Family  Contact Method: Phone  Phone Number: [phone]  Reason/Outcome: Continuity of Care, Emergency Contact, Discharge Planning                   Would you like to participate in our [address] [employer] service program?  : No - Declined    Treatment Team Recommendation: Home      CM reviewed d/c planning process including the following: identifying help at home, patient preference for d/c planning needs, Discharge Lounge, [facility] Meds to Bed program, availability of treatment team to discuss questions or concerns patient and/or family may have regarding understanding medications and recognizing signs and symptoms once discharged. CM also encouraged patient to follow up with all recommended appointments after discharge. Patient advised of importance for patient and family to participate in managing patient’s medical well being. Patient/caregiver received discharge checklist.  Content reviewed. Patient/caregiver encouraged to participate in discharge plan of care prior to discharge home.

## 2023-08-31 ENCOUNTER — TELEPHONE (OUTPATIENT)
Dept: CARDIOLOGY CLINIC | Facility: CLINIC | Age: 68
End: 2023-08-31

## 2023-08-31 NOTE — TELEPHONE ENCOUNTER
I called [employer] and notified Katelyn Rodriguez will make a note to stop the alerts. A form will be faxed to be signed by Dr Traci Gamboa, then we can fax back. Also, pt is concerned because of all [facility]. He is asking for them to cease. I notified [employer] and spoke to Rosa Millan. She will put a note in to this effect.

## 2023-08-31 NOTE — TELEPHONE ENCOUNTER
Call from Effie Elliott at [employer] with an abnormal EKG on patient.     Call back : [phone]  Ref # [id number]

## 2023-08-31 NOTE — TELEPHONE ENCOUNTER
Hi, thanks for the info.  I spoke with Dr uSsie Blum does not want any more alerts called to the physicians.   I'll call [employer] to advise them.     Thanks! Trisch         Another call from Effie Elliott at [employer]:     Hi, my name is Effie Elliott. I'm calling from [employer]. . I have a [employer] notification on [employer] [birth year]. My call back number is [phone]. Reference number one [id number].  Thank you

## 2023-08-31 NOTE — TELEPHONE ENCOUNTER
Another call from Romel Palma at [employer]:    Hi, my name is Romel Palma. I'm calling from [employer]. . I have a Zio notification on Thea Pham [birth year]. My call back number is [phone]. Reference number one [id number]. Thank you.

## 2023-09-08 ENCOUNTER — TELEPHONE (OUTPATIENT)
Dept: CARDIOLOGY CLINIC | Facility: CLINIC | Age: 68
End: 2023-09-08

## 2023-09-08 ENCOUNTER — DOCUMENTATION (OUTPATIENT)
Dept: CARDIOLOGY CLINIC | Facility: CLINIC | Age: 68
End: 2023-09-08

## 2023-09-08 NOTE — TELEPHONE ENCOUNTER
, my name is Rosa Millan, I'm calling from [employer], my [employer] on [address]. Date of birth [birth year]. My call back is [phone] reference number [id number].  Thank you

## 2023-09-08 NOTE — TELEPHONE ENCOUNTER
Dr. Janice Jimenez spoke to pt. He refused pacemaker at this time, but would like to continue wearing monitor until Wednesday, 9/13/23.

## 2023-09-19 ENCOUNTER — CLINICAL SUPPORT (OUTPATIENT)
Dept: CARDIOLOGY CLINIC | Facility: CLINIC | Age: 68
End: 2023-09-19
Payer: MEDICARE

## 2023-09-19 DIAGNOSIS — I48.92 ATRIAL FLUTTER, UNSPECIFIED TYPE (HCC): Primary | ICD-10-CM

## 2023-09-19 DIAGNOSIS — I48.92 ATRIAL FLUTTER, UNSPECIFIED TYPE (HCC): ICD-10-CM

## 2023-09-19 DIAGNOSIS — R00.1 BRADYCARDIA: ICD-10-CM

## 2023-09-19 PROCEDURE — RECHECK: Performed by: INTERNAL MEDICINE

## 2023-09-19 PROCEDURE — 93228 REMOTE 30 DAY ECG REV/REPORT: CPT | Performed by: INTERNAL MEDICINE

## 2023-10-10 ENCOUNTER — OFFICE VISIT (OUTPATIENT)
Dept: CARDIOLOGY CLINIC | Facility: CLINIC | Age: 68
End: 2023-10-10
Payer: MEDICARE

## 2023-10-10 VITALS
HEIGHT: 70 IN | HEART RATE: 51 BPM | BODY MASS INDEX: 30.92 KG/M2 | DIASTOLIC BLOOD PRESSURE: 80 MMHG | SYSTOLIC BLOOD PRESSURE: 176 MMHG | WEIGHT: 216 LBS

## 2023-10-10 DIAGNOSIS — R00.1 BRADYCARDIA: ICD-10-CM

## 2023-10-10 DIAGNOSIS — Z98.890 STATUS POST ABLATION OF ATRIAL FLUTTER: ICD-10-CM

## 2023-10-10 DIAGNOSIS — I10 HYPERTENSION, UNSPECIFIED TYPE: ICD-10-CM

## 2023-10-10 DIAGNOSIS — Z86.79 STATUS POST ABLATION OF ATRIAL FLUTTER: ICD-10-CM

## 2023-10-10 DIAGNOSIS — I48.92 ATRIAL FLUTTER, UNSPECIFIED TYPE (HCC): Primary | ICD-10-CM

## 2023-10-10 DIAGNOSIS — I48.19 PERSISTENT ATRIAL FIBRILLATION (HCC): ICD-10-CM

## 2023-10-10 PROBLEM — I48.91 ATRIAL FIBRILLATION (HCC): Status: ACTIVE | Noted: 2023-10-10

## 2023-10-10 PROCEDURE — 99214 OFFICE O/P EST MOD 30 MIN: CPT | Performed by: PHYSICIAN ASSISTANT

## 2023-10-10 PROCEDURE — 93000 ELECTROCARDIOGRAM COMPLETE: CPT | Performed by: PHYSICIAN ASSISTANT

## 2023-10-10 NOTE — PROGRESS NOTES
Electrophysiology Follow Up    [address]  [address] [facility] [address] : 1955  MRN: [id number]    Date of Visit: 10/10/2023       Assessment/Plan     1. Atrial flutter, unspecified type ([address])  POCT ECG      2. Status post ablation of atrial flutter (CTI line) 2023        3. Bradycardia        4. Persistent atrial fibrillation ([address])        5. Hypertension, unspecified type            ASSESSMENT:  1. Atrial flutter with slow ventricular response, status post typical flutter line 2023   A.)  Incidentally noted at time of colonoscopy 3/2023   B.)  Holter monitor 2023 showed an average heart rate of 41 bpm while in flutter   C.)  Currently in coarse atrial fibrillation versus atypical atrial flutter with slow ventricular response, asymptomatic   D.)  [id number] = 2, will continue Xarelto anticoagulation   E.)  Not able to tolerate AV araseli agents due to #2  2. Transient complete heart block noted in the immediate post ablation setting, improved to Mobitz type I with blocked PACs   A.)  Not on AV araseli agents   B.)  Asymptomatic, thus pacemaker not pursued  3. Normal LV systolic function with LVEF 55-60% per echo 2023   A.)  Severely dilated left atrium (5.9 cm)   B.)  Moderate mitral regurgitation  4. Hypertension  5. Hyperlipidemia  6. Hypothyroidism, maintained on Synthroid      DISCUSSION/SUMMARY:  EKG today shows that he is in likely course atrial fibrillation, possibly atypical atrial flutter, with slow ventricular response. Fortunately, he feels well and denies all cardiac complaints. We discussed this new diagnosis, and possible treatment options moving forward. I explained that he can remain in atrial fibrillation given that he is asymptomatic, however he would then have to remain on anticoagulation.   We could perform a cardioversion to restore normal sinus rhythm, however without antiarrhythmics I think he would quickly revert back to atrial fibrillation. I further explained that given his baseline low heart rate, I would not feel comfortable starting antiarrhythmics without placement of a pacemaker. Pacemaker implantation has been discussed in the past, however as the patient is asymptomatic he has declined. He again reports feeling well overall, and his activities are not limited in any way. Thus, he again does not want to pursue a pacemaker at this time. He understands and agrees with the fact that he will need one moving forward, but will likely not agree to this until he develops symptoms. We reviewed symptoms to look out for, and he knows to contact us with any changes. Given that we are not pursuing pacemaker placement, he will remain in atrial fibrillation. In the meantime, he is going to remain on Xarelto anticoagulation. He is going to look into the cost of other anticoagulants, and contact us if other options are more affordable. His blood pressure is uncontrolled today, 176/80. He brought his home blood pressure logs which show better control, however I still feel improvements can be made. We discussed possibly increasing his hydrochlorothiazide from 12.5 mg daily to 25 mg daily. For now, he will consider this option and will discuss with Dr. Jenny Palma moving forward. He is going to continue to monitor his blood pressures at home and contact Dr. Jenny Palma if he is consistently hypertensive. I asked him to follow-up with us again in 6 months for ongoing rate and rhythm monitoring, but he will contact us in the meantime with any questions, concerns, or changes in symptoms.       History of Present Illness     CHIEF COMPLAINT: Post ablation follow-up    HPI/INTERVAL HISTORY: Kobe Block is a 70 y.o. male with atrial flutter status post typical flutter line, normal LV systolic function with LVEF 55-60%, known bradycardia with transient complete heart block and Mobitz 1 in the post ablation setting, hypertension, hyperlipidemia, and hypothyroidism. He typically follows with Dr. Saint Hunter as an outpatient. He was incidentally found to have atrial flutter at the time of colonoscopy, and was started on Xarelto anticoagulation. He is not able to tolerate AV araseli agents given baseline bradycardia. He was seen in EP consultation, and ultimately an atrial flutter ablation was recommended. He underwent successful typical flutter line 8/29/2023. In the immediate post ablation setting he was found to have transient complete heart block. Fortunately he was asymptomatic with otherwise stable vital signs, this ongoing monitoring was recommended. His rhythms improved to Mobitz type I, thus he was discharged with a live event monitor. His first monitor showed normal sinus rhythm again with transient heart block during sleeping hours with which she was asymptomatic. His second monitor showed atrial fibrillation with slow ventricular response, nonsustained VT, however no significant pauses. He offers no significant complaints while wearing these monitors. He is now being seen today in post ablation follow-up. He reports feeling well overall. He is very active in general and denies chest pain or pressure, shortness of breath, dizziness, lightheadedness, presyncope, syncope, worsening lower extremity edema, or significant palpitations. He can feel strong heartbeats at times, and became aware of this after being diagnosed with atrial flutter. He admits that yesterday he was running around with his grandson when he started noticing "sparkles" in his vision and thus he stopped. His symptoms then resolved, and he denied that he was feeling presyncopal at that time. He admits to taking afternoon nap daily, but has been doing this for a long time. He does not think his activities are limited by his bradycardia.       Review of Systems  ROS as noted above, otherwise 12 point review of systems was performed and is negative. Historical Information  - I have personally reviewed and updated this information, including social history, medical history, and family history. Social History     Socioeconomic History   • Marital status: /Civil Union     Spouse name: Not on file   • Number of children: Not on file   • Years of education: Not on file   • Highest education level: Not on file   Occupational History   • Not on file   Tobacco Use   • Smoking status: Never   • Smokeless tobacco: Never   Vaping Use   • Vaping Use: Never used   Substance and Sexual Activity   • Alcohol use: Yes     Comment: occasional   • Drug use: Never   • Sexual activity: Not on file   Other Topics Concern   • Not on file   Social History Narrative   • Not on file     Social Determinants of Health     Financial Resource Strain: Not on file   Food Insecurity: No Food Insecurity (8/30/2023)    Hunger Vital Sign    • Worried About Running Out of Food in the Last Year: Never true    • Ran Out of Food in the Last Year: Never true   Transportation Needs: No Transportation Needs (8/30/2023)    PRAPARE - Transportation    • Lack of Transportation (Medical): No    • Lack of Transportation (Non-Medical):  No   Physical Activity: Not on file   Stress: Not on file   Social Connections: Not on file   Intimate Partner Violence: Not on file   Housing Stability: Low Risk  (8/30/2023)    Housing Stability Vital Sign    • Unable to Pay for Housing in the Last Year: No    • Number of Places Lived in the Last Year: 1    • Unstable Housing in the Last Year: No     Past Medical History:   Diagnosis Date   • Colon polyp    • GERD (gastroesophageal reflux disease)    • Hyperlipidemia    • Seasonal allergies    • Thyroid disease      Past Surgical History:   Procedure Laterality Date   • ACHILLES TENDON REPAIR Left    • CARDIAC ELECTROPHYSIOLOGY PROCEDURE N/A 8/29/2023    Procedure: Cardiac eps/aflutter ablation;  Surgeon: Timothy Giron DO;  Location: [facility];   Service: Cardiology   • COLONOSCOPY       Social History     Substance and Sexual Activity   Alcohol Use Yes    Comment: occasional     Social History     Substance and Sexual Activity   Drug Use Never     Social History     Tobacco Use   Smoking Status Never   Smokeless Tobacco Never     Family History   Problem Relation Age of Onset   • Colon cancer Father    • Diabetes Brother        Meds/Allergies       Current Outpatient Medications:   •  albuterol (PROVENTIL HFA,VENTOLIN HFA) 90 mcg/act inhaler, Inhale 2 puffs every 6 (six) hours as needed for wheezing, Disp: , Rfl:   •  amoxicillin (AMOXIL) 500 mg capsule, Take 2,000 mg by mouth if needed Prior to dental work, Disp: , Rfl:   •  candesartan (ATACAND) 32 MG tablet, Take 1 tablet (32 mg total) by mouth daily, Disp: 90 tablet, Rfl: 3  •  fexofenadine (ALLEGRA) 60 MG tablet, if needed, Disp: , Rfl:   •  finasteride (PROSCAR) 5 mg tablet, Take 5 mg by mouth daily, Disp: , Rfl:   •  hydrochlorothiazide (HYDRODIURIL) 12.5 mg tablet, Take 1 tablet (12.5 mg total) by mouth daily, Disp: 90 tablet, Rfl: 1  •  ibuprofen (MOTRIN) 200 mg tablet, , Disp: , Rfl:   •  levothyroxine 150 mcg tablet, TAKE 1 TABLET BY MOUTH EVERY DAY IN THE MORNING ON EMPTY STOMACH, Disp: , Rfl:   •  Multiple Vitamins-Minerals (Mens 50+ Multivitamin) TABS, Take by mouth, Disp: , Rfl:   •  omeprazole (PriLOSEC) 20 mg delayed release capsule, Take 20 mg by mouth daily, Disp: , Rfl:   •  oxymetazoline (AFRIN) 0.05 % nasal spray, 2 sprays by Each Nare route 2 (two) times a day, Disp: , Rfl:   •  QUEtiapine (SEROquel) 50 mg tablet, TAKE 1 TABLET BY MOUTH EVERY DAY AT BEDTIME FOR 30 DAYS, Disp: , Rfl:   •  rivaroxaban (Xarelto) 20 mg tablet, Take 1 tablet (20 mg total) by mouth daily with breakfast, Disp: 90 tablet, Rfl: 3  •  simvastatin (ZOCOR) 20 mg tablet, Take 20 mg by mouth every evening, Disp: , Rfl:   •  fluticasone (FLONASE) 50 mcg/act nasal spray, if needed (Patient not taking: Reported on 10/10/2023), Disp: , Rfl:     No Known Allergies    Objective   Vitals: Blood pressure (!) 176/80, pulse (!) 51, height 5' 10" (1.778 m), weight 98 kg (216 lb).         Physical Exam  GEN: NAD, alert and oriented x 3, well appearing  SKIN: dry without significant lesions or rashes  HEENT: NCAT, PERRL, EOMs intact  NECK: No JVD appreciated  CARDIOVASCULAR: Irregular, bradycardic, normal S1, S2 without murmurs, rubs, or gallops appreciated  LUNGS: Clear to auscultation bilaterally without wheezes, rhonchi, or rales  ABDOMEN: Soft, nontender, nondistended  EXTREMITIES/VASCULAR: perfused without clubbing, cyanosis; right LE edema noted (at baseline), trace left LE edema  PSYCH: Normal mood and affect  NEURO: CN ll-Xll grossly intact        Labs:  Admission on 08/29/2023, Discharged on 08/30/2023   Component Date Value   • Sodium 08/29/2023 140    • Potassium 08/29/2023 3.6    • Chloride 08/29/2023 104    • CO2 08/29/2023 29    • ANION GAP 08/29/2023 7    • BUN 08/29/2023 24    • Creatinine 08/29/2023 1.08    • Glucose 08/29/2023 98    • Glucose, Fasting 08/29/2023 98    • Calcium 08/29/2023 9.8    • eGFR 08/29/2023 69    • WBC 08/29/2023 6.82    • RBC 08/29/2023 5.08    • Hemoglobin 08/29/2023 14.8    • Hematocrit 08/29/2023 47.7    • MCV 08/29/2023 94    • MCH 08/29/2023 29.1    • MCHC 08/29/2023 31.0 (L)    • RDW 08/29/2023 13.5    • Platelets 72/72/8514 217    • MPV 08/29/2023 11.8    • Protime 08/29/2023 15.5 (H)    • INR 08/29/2023 1.20 (H)    • Ventricular Rate 08/29/2023 43    • Atrial Rate 08/29/2023 267    • QRSD Interval 08/29/2023 104    • QT Interval 08/29/2023 514    • QTC Interval 08/29/2023 434    • P Axis 08/29/2023 39    • QRS Axis 08/29/2023 6    • T Wave Axis 08/29/2023 66    • Ventricular Rate 08/29/2023 35    • Atrial Rate 08/29/2023 58    • QRSD Interval 08/29/2023 96    • QT Interval 08/29/2023 552    • QTC Interval 08/29/2023 421    • P Axis 08/29/2023 68    • QRS Axis 08/29/2023 21    • T Wave Axis 08/29/2023 69    • WBC 08/30/2023 7.73    • RBC 08/30/2023 3.97    • Hemoglobin 08/30/2023 11.8 (L)    • Hematocrit 08/30/2023 37.1    • MCV 08/30/2023 94    • MCH 08/30/2023 29.7    • MCHC 08/30/2023 31.8    • RDW 08/30/2023 13.9    • Platelets 44/70/3676 169    • MPV 08/30/2023 12.2    • Sodium 08/30/2023 140    • Potassium 08/30/2023 3.9    • Chloride 08/30/2023 108    • CO2 08/30/2023 25    • ANION GAP 08/30/2023 7    • BUN 08/30/2023 20    • Creatinine 08/30/2023 0.94    • Glucose 08/30/2023 89    • Glucose, Fasting 08/30/2023 89    • Calcium 08/30/2023 8.0 (L)    • eGFR 08/30/2023 82    • Ventricular Rate 08/30/2023 43    • Atrial Rate 08/30/2023 63    • QRSD Interval 08/30/2023 108    • QT Interval 08/30/2023 520    • QTC Interval 08/30/2023 439    • P Axis 08/30/2023 79    • QRS Axis 08/30/2023 6    • T Wave Axis 08/30/2023 71          Imaging: I have personally reviewed pertinent reports. ECHO: No results found for this or any previous visit. Results for orders placed during the hospital encounter of 06/05/23    Echo complete w/ contrast if indicated    Interpretation Summary  •  Left Ventricle: Left ventricular cavity size is normal. Wall thickness is moderately increased. There is moderate concentric hypertrophy. The left ventricular ejection fraction is 55-60%. Systolic function is normal. Wall motion is normal. Diastolic function is abnormal.  •  Right Ventricle: Right ventricular cavity size is dilated. Systolic function is normal.  •  Left Atrium: The atrium is severely dilated. •  Right Atrium: The atrium is dilated. •  Atrial Septum: The septum bows into the right atrium, suggesting increased left atrial pressure. •  Aortic Valve: There is mild regurgitation. •  Mitral Valve: There is moderate regurgitation with an eccentrically directed jet. •  Tricuspid Valve: There is mild regurgitation. The right ventricular systolic pressure is mildly elevated. The estimated right ventricular systolic pressure is 78.78 mmHg. •  Aorta: The aortic root is normal in size. The ascending aorta is moderately dilated. The aortic root is 3.50 cm. The ascending aorta is 4.4 cm.         CATH/STRESS TEST: no recent studies noted      EKG: Coarse atrial fibrillation versus atypical flutter with slow ventricular response, heart rate ranging from 39-51 bpm, intermittent PVCs versus PACs with aberrancy

## 2023-10-30 ENCOUNTER — OFFICE VISIT (OUTPATIENT)
Dept: CARDIOLOGY CLINIC | Facility: CLINIC | Age: 68
End: 2023-10-30
Payer: MEDICARE

## 2023-10-30 VITALS
BODY MASS INDEX: 31.07 KG/M2 | DIASTOLIC BLOOD PRESSURE: 80 MMHG | SYSTOLIC BLOOD PRESSURE: 150 MMHG | HEART RATE: 47 BPM | WEIGHT: 217 LBS | HEIGHT: 70 IN

## 2023-10-30 DIAGNOSIS — R00.1 BRADYCARDIA: ICD-10-CM

## 2023-10-30 DIAGNOSIS — Z86.79 STATUS POST ABLATION OF ATRIAL FLUTTER: ICD-10-CM

## 2023-10-30 DIAGNOSIS — I48.19 PERSISTENT ATRIAL FIBRILLATION (HCC): ICD-10-CM

## 2023-10-30 DIAGNOSIS — I48.92 ATRIAL FLUTTER, UNSPECIFIED TYPE (HCC): Primary | ICD-10-CM

## 2023-10-30 DIAGNOSIS — I11.9 HYPERTENSIVE HEART DISEASE WITHOUT HEART FAILURE: ICD-10-CM

## 2023-10-30 DIAGNOSIS — I47.29 NSVT (NONSUSTAINED VENTRICULAR TACHYCARDIA) (HCC): ICD-10-CM

## 2023-10-30 DIAGNOSIS — Z98.890 STATUS POST ABLATION OF ATRIAL FLUTTER: ICD-10-CM

## 2023-10-30 PROCEDURE — 99214 OFFICE O/P EST MOD 30 MIN: CPT | Performed by: INTERNAL MEDICINE

## 2023-10-30 PROCEDURE — 93000 ELECTROCARDIOGRAM COMPLETE: CPT | Performed by: INTERNAL MEDICINE

## 2023-10-30 NOTE — PROGRESS NOTES
EPS Progress Note - Daniela Shine. 70 y.o. male MRN: [id number]           ASSESSMENT:  1. Atrial flutter, unspecified type ([address])  POCT ECG      2. Persistent atrial fibrillation (HCC)  POCT ECG      3. NSVT (nonsustained ventricular tachycardia) ([facility])  Stress test only, exercise      4. Hypertensive heart disease without heart failure  Stress test only, exercise      5. Bradycardia        6. Status post ablation of atrial flutter (CTI line) 8/29/2023                PLAN:  Again I explained to Mr. Helena Mccrary that I really believe he would benefit from a pacemaker. He had atrial flutter with slow ventricular response and now he has atrial fibrillation with slow ventricular response. He has significant AV araseli disease off of medications he is bradycardic. He is pretty emphatic that he is asymptomatic and he does not want a pacemaker at this point in time. My concern is that he will ultimately be at risk for syncope or bradycardia induced torsades. At this point in time he is not ready to move forward with the pacemaker we will regroup after the New Year's to evaluate his need for pacemaker. In the meantime he will continue on Xarelto for stroke protection    If pacemaker is placed I believe we can start him on flecainide which will help maintain sinus rhythm    HPI:   Interim history f/u for bradycardia. No symptoms of feeling like he will pass out. Occasional seeing spots only when getting up quickly, lifting heavy weights and once when running around in a yard. No symptoms with yard work. ROS   Occasional fatigue but overall feels well. No syncope or pre-syncope. No SOB with exertion    Objective:     Vitals: Blood pressure 150/80, pulse (!) 47, height 5' 10" (1.778 m), weight 98.4 kg (217 lb). , Body mass index is 31.14 kg/m². ,        Physical Exam:    GEN: Daniela Shine.  appears well, alert and oriented x 3, pleasant and cooperative   HEENT: pupils equal, round, and reactive to light; extraocular muscles intact  NECK: supple, no carotid bruits   HEART: irregular rhythm, normal S1 and S2, no murmurs, clicks, gallops or rubs   LUNGS: clear to auscultation bilaterally; no wheezes, rales, or rhonchi   ABDOMEN: normal bowel sounds, soft, no tenderness, no distention  EXTREMITIES: peripheral pulses normal; no clubbing, cyanosis, or edema  NEURO: no focal findings   SKIN: normal without suspicious lesions on exposed skin    Medications:      Current Outpatient Medications:     albuterol (PROVENTIL HFA,VENTOLIN HFA) 90 mcg/act inhaler, Inhale 2 puffs every 6 (six) hours as needed for wheezing, Disp: , Rfl:     amoxicillin (AMOXIL) 500 mg capsule, Take 2,000 mg by mouth if needed Prior to dental work, Disp: , Rfl:     candesartan (ATACAND) 32 MG tablet, Take 1 tablet (32 mg total) by mouth daily, Disp: 90 tablet, Rfl: 3    fexofenadine (ALLEGRA) 60 MG tablet, if needed, Disp: , Rfl:     finasteride (PROSCAR) 5 mg tablet, Take 5 mg by mouth daily, Disp: , Rfl:     fluticasone (FLONASE) 50 mcg/act nasal spray, if needed, Disp: , Rfl:     hydrochlorothiazide (HYDRODIURIL) 12.5 mg tablet, Take 1 tablet (12.5 mg total) by mouth daily, Disp: 90 tablet, Rfl: 1    ibuprofen (MOTRIN) 200 mg tablet, , Disp: , Rfl:     levothyroxine 150 mcg tablet, TAKE 1 TABLET BY MOUTH EVERY DAY IN THE MORNING ON EMPTY STOMACH, Disp: , Rfl:     Multiple Vitamins-Minerals (Mens 50+ Multivitamin) TABS, Take by mouth, Disp: , Rfl:     omeprazole (PriLOSEC) 20 mg delayed release capsule, Take 20 mg by mouth daily, Disp: , Rfl:     oxymetazoline (AFRIN) 0.05 % nasal spray, 2 sprays by Each Nare route 2 (two) times a day, Disp: , Rfl:     QUEtiapine (SEROquel) 50 mg tablet, TAKE 1 TABLET BY MOUTH EVERY DAY AT BEDTIME FOR 30 DAYS, Disp: , Rfl:     rivaroxaban (Xarelto) 20 mg tablet, Take 1 tablet (20 mg total) by mouth daily with breakfast, Disp: 90 tablet, Rfl: 3    simvastatin (ZOCOR) 20 mg tablet, Take 20 mg by mouth every evening, Disp: , Rfl:      Family History   Problem Relation Age of Onset    Colon cancer Father     Diabetes Brother      Social History     Socioeconomic History    Marital status: /Civil Union     Spouse name: Not on file    Number of children: Not on file    Years of education: Not on file    Highest education level: Not on file   Occupational History    Not on file   Tobacco Use    Smoking status: Never    Smokeless tobacco: Never   Vaping Use    Vaping Use: Never used   Substance and Sexual Activity    Alcohol use: Yes     Comment: occasional    Drug use: Never    Sexual activity: Not on file   Other Topics Concern    Not on file   Social History Narrative    Not on file     Social Determinants of Health     Financial Resource Strain: Not on file   Food Insecurity: No Food Insecurity (8/30/2023)    Hunger Vital Sign     Worried About Running Out of Food in the Last Year: Never true     Ran Out of Food in the Last Year: Never true   Transportation Needs: No Transportation Needs (8/30/2023)    PRAPARE - Transportation     Lack of Transportation (Medical): No     Lack of Transportation (Non-Medical): No   Physical Activity: Not on file   Stress: Not on file   Social Connections: Not on file   Intimate Partner Violence: Not on file   Housing Stability: Low Risk  (8/30/2023)    Housing Stability Vital Sign     Unable to Pay for Housing in the Last Year: No     Number of Places Lived in the Last Year: 1     Unstable Housing in the Last Year: No     Social History     Tobacco Use   Smoking Status Never   Smokeless Tobacco Never     Social History     Substance and Sexual Activity   Alcohol Use Yes    Comment: occasional       Labs & Results:  Below is the patient's most recent value for Albumin, ALT, AST, BUN, Calcium, Chloride, Cholesterol, CO2, Creatinine, GFR, Glucose, HDL, Hematocrit, Hemoglobin, Hemoglobin A1C, LDL, Magnesium, Phosphorus, Platelets, Potassium, PSA, Sodium, Triglycerides, and WBC.    Lab Results Component Value Date    ALT 18 08/18/2023    AST 28 08/18/2023    BUN 20 08/30/2023    CALCIUM 8.0 (L) 08/30/2023     08/30/2023    CO2 25 08/30/2023    CREATININE 0.94 08/30/2023    HCT 37.1 08/30/2023    HGB 11.8 (L) 08/30/2023     08/30/2023    K 3.9 08/30/2023    WBC 7.73 08/30/2023     Note: for a comprehensive list of the patient's lab results, access the Results Review activity. Cardiac testing:   No results found for this or any previous visit. No results found for this or any previous visit. No results found for this or any previous visit. No results found for this or any previous visit.

## 2023-11-14 ENCOUNTER — HOSPITAL ENCOUNTER (OUTPATIENT)
Dept: NON INVASIVE DIAGNOSTICS | Age: 68
Discharge: HOME | End: 2023-11-14
Payer: MEDICARE

## 2023-11-14 DIAGNOSIS — I47.29 NSVT (NONSUSTAINED VENTRICULAR TACHYCARDIA) (HCC): ICD-10-CM

## 2023-11-14 DIAGNOSIS — I11.9 HYPERTENSIVE HEART DISEASE WITHOUT HEART FAILURE: ICD-10-CM

## 2023-11-14 LAB
BASELINE ST DEPRESSION: 1 MM
MAX HR PERCENT: 66 %
MAX HR: 100 BPM
RATE PRESSURE PRODUCT: NORMAL
SL CV STRESS STAGE REACHED: 3
STRESS ANGINA INDEX: 0
STRESS BASELINE HR: 51 BPM
STRESS PEAK HR: 100 BPM
STRESS POST ESTIMATED WORKLOAD: 10.1 METS
STRESS POST EXERCISE DUR MIN: 8 MIN
STRESS POST EXERCISE DUR SEC: 0 SEC
STRESS POST PEAK BP: 200 MMHG

## 2023-11-14 PROCEDURE — 93017 CV STRESS TEST TRACING ONLY: CPT

## 2023-11-14 PROCEDURE — 93018 CV STRESS TEST I&R ONLY: CPT | Performed by: INTERNAL MEDICINE

## 2023-11-14 PROCEDURE — 93016 CV STRESS TEST SUPVJ ONLY: CPT | Performed by: INTERNAL MEDICINE

## 2023-11-16 LAB
MAX HR PERCENT: 100 %
MAX HR: 88 BPM
STRESS BASELINE BP: NORMAL MMHG
STRESS BASELINE HR: 59 BPM
STRESS POST ESTIMATED WORKLOAD: 10.1 METS
STRESS POST PEAK HR: 100 BPM
STRESS POST PEAK SYSTOLIC BP: 200 MMHG

## 2023-12-06 DIAGNOSIS — I10 PRIMARY HYPERTENSION: ICD-10-CM

## 2023-12-06 RX ORDER — HYDROCHLOROTHIAZIDE 12.5 MG/1
12.5 TABLET ORAL DAILY
Qty: 90 TABLET | Refills: 1 | Status: SHIPPED | OUTPATIENT
Start: 2023-12-06

## 2024-01-25 ENCOUNTER — OFFICE VISIT (OUTPATIENT)
Dept: CARDIOLOGY CLINIC | Facility: CLINIC | Age: 69
End: 2024-01-25
Payer: MEDICARE

## 2024-01-25 VITALS
HEIGHT: 70 IN | WEIGHT: 212 LBS | SYSTOLIC BLOOD PRESSURE: 174 MMHG | BODY MASS INDEX: 30.35 KG/M2 | DIASTOLIC BLOOD PRESSURE: 80 MMHG | HEART RATE: 42 BPM

## 2024-01-25 DIAGNOSIS — Z86.79 STATUS POST ABLATION OF ATRIAL FLUTTER: ICD-10-CM

## 2024-01-25 DIAGNOSIS — Z98.890 STATUS POST ABLATION OF ATRIAL FLUTTER: ICD-10-CM

## 2024-01-25 DIAGNOSIS — I10 HYPERTENSION, UNSPECIFIED TYPE: ICD-10-CM

## 2024-01-25 DIAGNOSIS — R00.1 ASYMPTOMATIC BRADYCARDIA: ICD-10-CM

## 2024-01-25 DIAGNOSIS — I48.92 ATRIAL FLUTTER, UNSPECIFIED TYPE (HCC): Primary | ICD-10-CM

## 2024-01-25 DIAGNOSIS — E78.00 PURE HYPERCHOLESTEROLEMIA: ICD-10-CM

## 2024-01-25 DIAGNOSIS — I10 PRIMARY HYPERTENSION: ICD-10-CM

## 2024-01-25 PROCEDURE — 99214 OFFICE O/P EST MOD 30 MIN: CPT | Performed by: INTERNAL MEDICINE

## 2024-01-25 PROCEDURE — 93000 ELECTROCARDIOGRAM COMPLETE: CPT | Performed by: INTERNAL MEDICINE

## 2024-01-25 RX ORDER — ROSUVASTATIN CALCIUM 5 MG/1
5 TABLET, COATED ORAL DAILY
Qty: 90 TABLET | Refills: 3 | Status: SHIPPED | OUTPATIENT
Start: 2024-01-25

## 2024-01-25 RX ORDER — AMLODIPINE BESYLATE 2.5 MG/1
2.5 TABLET ORAL DAILY
Qty: 90 TABLET | Refills: 3 | Status: SHIPPED | OUTPATIENT
Start: 2024-01-25

## 2024-01-25 RX ORDER — HYDROCHLOROTHIAZIDE 25 MG/1
25 TABLET ORAL DAILY
Qty: 90 TABLET | Refills: 3 | Status: SHIPPED | OUTPATIENT
Start: 2024-01-25

## 2024-01-25 RX ORDER — AMLODIPINE BESYLATE 5 MG/1
5 TABLET ORAL DAILY
Qty: 90 TABLET | Refills: 3 | Status: SHIPPED | OUTPATIENT
Start: 2024-01-25 | End: 2024-01-25

## 2024-01-25 NOTE — PROGRESS NOTES
"EPS Progress Note - Yves Valencia JrMaria A 68 y.o. male MRN: [id number]           ASSESSMENT:  1. Atrial flutter, unspecified type (HCC)  POCT ECG    rosuvastatin (CRESTOR) 5 mg tablet      2. Hypertension, unspecified type  rosuvastatin (CRESTOR) 5 mg tablet    amLODIPine (NORVASC) 2.5 mg tablet    Basic metabolic panel    DISCONTINUED: amLODIPine (NORVASC) 5 mg tablet      3. Status post ablation of atrial flutter (CTI line) 8/29/2023        4. Pure hypercholesterolemia  rosuvastatin (CRESTOR) 5 mg tablet    amLODIPine (NORVASC) 2.5 mg tablet      5. Primary hypertension  hydrochlorothiazide (HYDRODIURIL) 25 mg tablet    amLODIPine (NORVASC) 2.5 mg tablet              PLAN:  #1 hypertension changes as above for blood pressure #2.  For asymptomatic bradycardia we can continue to follow he actually had very good exercise tolerance and a stress test although he only approached 66% of his maximum predicted heart rate again I explained that he absolutely qualifies for pacemaker but he really does not want a pacemaker and does not believe that he needs one given that he has no symptoms.  At this point blood pressure control certainly appears to be more important than apace 3.  Anticoagulation continue Eliquis No. 4 hyperlipidemia change simvastatin to rosuvastatin given interaction with amlodipine    Follow-up 1 year with cardiology or electrophysiology    HPI:   Interim history no chest pain or sob.  Occasionally every other week gets up too fast or lifts something too heavy and sees some spots.  Denies lightheadedness or syncope.              ROS   No chest pain no shortness of breath occasional seeing spots with quick movement lying to standing or lifting heavy weights he denies any presyncope or syncope all other 12 point review of systems negative for complaints today    Objective:     Vitals: Blood pressure (!) 174/80, pulse (!) 42, height 5' 10\" (1.778 m), weight 96.2 kg (212 lb)., Body mass index is 30.42 kg/m².,    "     Physical Exam:    GEN: Yves Valencia Jr. appears well, alert and oriented x 3, pleasant and cooperative   HEENT: pupils equal, round, and reactive to light; extraocular muscles intact  NECK: supple, no carotid bruits   HEART: Slightly irregular  rhythm, normal S1 and S2, no murmurs, clicks, gallops or rubs   LUNGS: clear to auscultation bilaterally; no wheezes, rales, or rhonchi   ABDOMEN: normal bowel sounds, soft, no tenderness, no distention  EXTREMITIES: peripheral pulses normal; no clubbing, cyanosis, or edema  NEURO: no focal findings   SKIN: normal without suspicious lesions on exposed skin    Medications:      Current Outpatient Medications:     albuterol (PROVENTIL HFA,VENTOLIN HFA) 90 mcg/act inhaler, Inhale 2 puffs every 6 (six) hours as needed for wheezing, Disp: , Rfl:     amLODIPine (NORVASC) 2.5 mg tablet, Take 1 tablet (2.5 mg total) by mouth daily, Disp: 90 tablet, Rfl: 3    amoxicillin (AMOXIL) 500 mg capsule, Take 2,000 mg by mouth if needed Prior to dental work, Disp: , Rfl:     candesartan (ATACAND) 32 MG tablet, Take 1 tablet (32 mg total) by mouth daily, Disp: 90 tablet, Rfl: 3    fexofenadine (ALLEGRA) 60 MG tablet, if needed, Disp: , Rfl:     finasteride (PROSCAR) 5 mg tablet, Take 5 mg by mouth daily, Disp: , Rfl:     hydrochlorothiazide (HYDRODIURIL) 25 mg tablet, Take 1 tablet (25 mg total) by mouth daily, Disp: 90 tablet, Rfl: 3    ibuprofen (MOTRIN) 200 mg tablet, , Disp: , Rfl:     levothyroxine 150 mcg tablet, TAKE 1 TABLET BY MOUTH EVERY DAY IN THE MORNING ON EMPTY STOMACH, Disp: , Rfl:     Multiple Vitamins-Minerals (Mens 50+ Multivitamin) TABS, Take by mouth, Disp: , Rfl:     omeprazole (PriLOSEC) 20 mg delayed release capsule, Take 20 mg by mouth daily, Disp: , Rfl:     oxymetazoline (AFRIN) 0.05 % nasal spray, 2 sprays by Each Nare route 2 (two) times a day, Disp: , Rfl:     QUEtiapine (SEROquel) 50 mg tablet, TAKE 1 TABLET BY MOUTH EVERY DAY AT BEDTIME FOR 30 DAYS, Disp: ,  Rfl:     rivaroxaban (Xarelto) 20 mg tablet, Take 1 tablet (20 mg total) by mouth daily with breakfast, Disp: 90 tablet, Rfl: 3    rosuvastatin (CRESTOR) 5 mg tablet, Take 1 tablet (5 mg total) by mouth daily, Disp: 90 tablet, Rfl: 3    fluticasone (FLONASE) 50 mcg/act nasal spray, if needed, Disp: , Rfl:      Family History   Problem Relation Age of Onset    Colon cancer Father     Diabetes Brother      Social History     Socioeconomic History    Marital status: /Civil Union     Spouse name: Not on file    Number of children: Not on file    Years of education: Not on file    Highest education level: Not on file   Occupational History    Not on file   Tobacco Use    Smoking status: Never    Smokeless tobacco: Never   Vaping Use    Vaping status: Never Used   Substance and Sexual Activity    Alcohol use: Yes     Comment: occasional    Drug use: Never    Sexual activity: Not on file   Other Topics Concern    Not on file   Social History Narrative    Not on file     Social Determinants of Health     Financial Resource Strain: Not on file   Food Insecurity: No Food Insecurity (8/30/2023)    Hunger Vital Sign     Worried About Running Out of Food in the Last Year: Never true     Ran Out of Food in the Last Year: Never true   Transportation Needs: No Transportation Needs (8/30/2023)    PRAPARE - Transportation     Lack of Transportation (Medical): No     Lack of Transportation (Non-Medical): No   Physical Activity: Not on file   Stress: Not on file   Social Connections: Not on file   Intimate Partner Violence: Not on file   Housing Stability: Low Risk  (8/30/2023)    Housing Stability Vital Sign     Unable to Pay for Housing in the Last Year: No     Number of Places Lived in the Last Year: 1     Unstable Housing in the Last Year: No     Social History     Tobacco Use   Smoking Status Never   Smokeless Tobacco Never     Social History     Substance and Sexual Activity   Alcohol Use Yes    Comment: occasional        Labs & Results:  Below is the patient's most recent value for Albumin, ALT, AST, BUN, Calcium, Chloride, Cholesterol, CO2, Creatinine, GFR, Glucose, HDL, Hematocrit, Hemoglobin, Hemoglobin A1C, LDL, Magnesium, Phosphorus, Platelets, Potassium, PSA, Sodium, Triglycerides, and WBC.   Lab Results   Component Value Date    ALT 18 08/18/2023    AST 28 08/18/2023    BUN 20 08/30/2023    CALCIUM 8.0 (L) 08/30/2023     08/30/2023    CO2 25 08/30/2023    CREATININE 0.94 08/30/2023    HCT 37.1 08/30/2023    HGB 11.8 (L) 08/30/2023     08/30/2023    K 3.9 08/30/2023    WBC 7.73 08/30/2023     Note: for a comprehensive list of the patient's lab results, access the Results Review activity.            Cardiac testing:   No results found for this or any previous visit.    No results found for this or any previous visit.    No results found for this or any previous visit.    No results found for this or any previous visit.

## 2024-01-25 NOTE — PATIENT INSTRUCTIONS
Start amlodipine 2.5 mg daily    Increase HCTZ to 25 mg daily    Stop simvastatin     Start rosuvastatin 5 mg daily    Chem 7 in 2 weeks    F/u 1 year

## 2024-01-30 ENCOUNTER — TELEPHONE (OUTPATIENT)
Dept: CARDIOLOGY CLINIC | Facility: CLINIC | Age: 69
End: 2024-01-30

## 2024-01-30 NOTE — TELEPHONE ENCOUNTER
Patient returned call.    Samples of Xarelto 20mg set aside for patient.     LOT: [id number]  Exp: October 2025  Dispensed: 3 bottles

## 2024-02-09 LAB
BUN SERPL-MCNC: 20 MG/DL (ref 8–27)
BUN/CREAT SERPL: 16 (ref 10–24)
CALCIUM SERPL-MCNC: 9.8 MG/DL (ref 8.6–10.2)
CHLORIDE SERPL-SCNC: 103 MMOL/L (ref 96–106)
CO2 SERPL-SCNC: 25 MMOL/L (ref 20–29)
CREAT SERPL-MCNC: 1.23 MG/DL (ref 0.76–1.27)
EGFR: 64 ML/MIN/1.73
GLUCOSE SERPL-MCNC: 103 MG/DL (ref 70–99)
POTASSIUM SERPL-SCNC: 4.3 MMOL/L (ref 3.5–5.2)
SODIUM SERPL-SCNC: 142 MMOL/L (ref 134–144)

## 2024-05-01 DIAGNOSIS — I10 HYPERTENSION, UNSPECIFIED TYPE: ICD-10-CM

## 2024-05-01 DIAGNOSIS — E78.00 PURE HYPERCHOLESTEROLEMIA: Primary | ICD-10-CM

## 2024-05-01 RX ORDER — SIMVASTATIN 5 MG
5 TABLET ORAL
Qty: 90 TABLET | Refills: 3 | Status: SHIPPED | OUTPATIENT
Start: 2024-05-01

## 2024-05-17 ENCOUNTER — TELEPHONE (OUTPATIENT)
Dept: CARDIOLOGY CLINIC | Facility: CLINIC | Age: 69
End: 2024-05-17

## 2024-05-17 NOTE — TELEPHONE ENCOUNTER
Patient called asking for samples of Xarelto 20 mg.    Xarelto 20 mg  LOT: [id number]  Exp: 02/2026  Quantity: 1 bottle

## 2024-06-25 DIAGNOSIS — I48.92 ATRIAL FLUTTER, UNSPECIFIED TYPE (HCC): ICD-10-CM

## 2024-06-25 RX ORDER — RIVAROXABAN 20 MG/1
20 TABLET, FILM COATED ORAL
Qty: 30 TABLET | Refills: 0 | Status: SHIPPED | OUTPATIENT
Start: 2024-06-25 | End: 2024-07-07

## 2024-06-25 NOTE — TELEPHONE ENCOUNTER
Pt contacted Call Center requested refill of their medication.        Doctor Name: Sylvain      Medication Name: Xartelo      Dosage of Med: 20mg      Frequency of Med: take 1 tablet daily with breakfast      Remaining Medication: about a week       Pharmacy and Location: [facility]        Pt. Preferred Callback Phone Number: [phone]

## 2024-07-06 DIAGNOSIS — I48.92 ATRIAL FLUTTER, UNSPECIFIED TYPE (HCC): ICD-10-CM

## 2024-07-07 RX ORDER — RIVAROXABAN 20 MG/1
20 TABLET, FILM COATED ORAL
Qty: 30 TABLET | Refills: 0 | Status: SHIPPED | OUTPATIENT
Start: 2024-07-07

## 2024-08-11 DIAGNOSIS — I10 PRIMARY HYPERTENSION: ICD-10-CM

## 2024-08-12 RX ORDER — CANDESARTAN 32 MG/1
32 TABLET ORAL DAILY
Qty: 90 TABLET | Refills: 1 | Status: SHIPPED | OUTPATIENT
Start: 2024-08-12

## 2024-08-16 ENCOUNTER — TELEPHONE (OUTPATIENT)
Dept: GASTROENTEROLOGY | Facility: CLINIC | Age: 69
End: 2024-08-16

## 2024-08-16 ENCOUNTER — OFFICE VISIT (OUTPATIENT)
Dept: GASTROENTEROLOGY | Facility: CLINIC | Age: 69
End: 2024-08-16
Payer: MEDICARE

## 2024-08-16 VITALS
WEIGHT: 207.2 LBS | BODY MASS INDEX: 29.66 KG/M2 | HEIGHT: 70 IN | DIASTOLIC BLOOD PRESSURE: 79 MMHG | SYSTOLIC BLOOD PRESSURE: 161 MMHG

## 2024-08-16 DIAGNOSIS — R19.4 CHANGE IN BOWEL HABITS: Primary | ICD-10-CM

## 2024-08-16 DIAGNOSIS — Z86.010 HISTORY OF COLON POLYPS: ICD-10-CM

## 2024-08-16 PROCEDURE — 99214 OFFICE O/P EST MOD 30 MIN: CPT | Performed by: NURSE PRACTITIONER

## 2024-08-16 NOTE — TELEPHONE ENCOUNTER
Scheduled date of colonoscopy (as of today): 9-19-24  Physician performing colonoscopy: Emil  Location of colonoscopy: [facility]  Bowel prep reviewed with patient: Gonzalo/Robbin  Instructions reviewed with patient by: BS  Clearances: Yes. [place] 2 day hold. Dr Narayan/Nannette  No AICD/Pacemaker/no Diabetic/ weight loss meds    Putting on cancel list for possible sooner appt.

## 2024-08-16 NOTE — TELEPHONE ENCOUNTER
Please see request from [facility].  Dr. Brunson, you most recently saw this pt in January - Dr. Narayan has not seen the pt since May '23. Would you be willing to review and comment?

## 2024-08-16 NOTE — PROGRESS NOTES
[facility] - Outpatient Follow-up Note  Yves Valencia . 69 y.o. male MRN: [id number]  Encounter: [id number]    ASSESSMENT AND PLAN:    1.  Change in bowel habits  Presents with 2-month history of watery/loose stools with 2 bowel movements daily  Now with 2 to 3-day history of small formed bowel movements with associated feeling of incomplete evacuation  Mild associated lower abdominal cramping and increased flatus  Denies recent antibiotic use, no recent travel, no change in medication  Reports some lactose intolerance, currently using lactose-free milk  Differential diagnosis includes possible IBS versus microscopic colitis versus functional diarrhea  Low suspicion for infectious etiology or malignancy but will proceed with colonoscopy to rule out  -Scheduled for colonoscopy at [facility] due to cardiac history and bradycardia  With colonic biopsy to rule out microscopic colitis      2.  GERD  Symptoms currently well-controlled on omeprazole 20 mg daily  No breakthrough symptoms    3.  History of colon polyps  4.  Family history of colon cancer in father  Prior colonoscopyy January 2018-1 tubular adenoma and 1 sessile adenoma excised  Due for surveillance  Also reports family history of colon polyps in his brother  Reports father found to have metastatic colon cancer on autopsy at age 76  -Scheduled for colonoscopy as above    Followup Appointment: 3 months  ______________________________________________________________________    Chief Complaint   Patient presents with    Diarrhea     Has not had a normal bowel movement since June.    Schedule Colonoscopy     HPI: Presents today with recent change in bowel habits.  Reports 2 month history of diarrhea with 2 bowel movements daily.  Mild associated lower abdominal cramping  Denies melena or rectal bleeding.  Unable to identify food triggers however does report some increased diarrhea with dairy products.  Currently  using lactose-free milk  Denies recent travel or antibiotic use.    Does report that over the past 2 to 3 days, he is now having small amounts of formed soft stools.  Feeling of incomplete evacuation  He was evaluated in our office in 2023 with similar symptoms.  Diarrhea resolved with use of Pepto-Bismol  Last colonoscopy was 2018 with adenomatous polyp excised.    He was scheduled to undergo colonoscopy in  March 2023 however he was found to be in atrial fibrillation.  Currently follows with [facility] cardiology, on Xarelto, s/p A-fib ablation    No dysphagia.  History of GERD with symptoms well-controlled on omeprazole 20 mg daily  No epigastric pain.  Appetite is good, denies recent unintentional weight loss but has lost approximately 30 pounds over the past year with dietary changes      Historical Information   Past Medical History:   Diagnosis Date    Colon polyp     GERD (gastroesophageal reflux disease)     Hyperlipidemia     Hypertension     Seasonal allergies     Thyroid disease      Past Surgical History:   Procedure Laterality Date    ACHILLES TENDON REPAIR Left     CARDIAC ELECTROPHYSIOLOGY PROCEDURE N/A 8/29/2023    Procedure: Cardiac eps/aflutter ablation;  Surgeon: Jony Brunson DO;  Location: [facility];  Service: Cardiology    COLONOSCOPY       Social History     Substance and Sexual Activity   Alcohol Use Yes    Comment: occasional     Social History     Substance and Sexual Activity   Drug Use Never     Social History     Tobacco Use   Smoking Status Never   Smokeless Tobacco Never     Family History   Problem Relation Age of Onset    Colon cancer Father     Diabetes Brother          Current Outpatient Medications:     albuterol (PROVENTIL HFA,VENTOLIN HFA) 90 mcg/act inhaler    amLODIPine (NORVASC) 2.5 mg tablet    amoxicillin (AMOXIL) 500 mg capsule    candesartan (ATACAND) 32 MG tablet    fexofenadine (ALLEGRA) 60 MG tablet    finasteride (PROSCAR) 5 mg tablet    hydrochlorothiazide  "(HYDRODIURIL) 25 mg tablet    ibuprofen (MOTRIN) 200 mg tablet    levothyroxine 150 mcg tablet    Multiple Vitamins-Minerals (Mens 50+ Multivitamin) TABS    omeprazole (PriLOSEC) 20 mg delayed release capsule    oxymetazoline (AFRIN) 0.05 % nasal spray    QUEtiapine (SEROquel) 50 mg tablet    simvastatin (ZOCOR) 5 MG tablet    Xarelto 20 MG tablet    fluticasone (FLONASE) 50 mcg/act nasal spray  No Known Allergies  Reviewed medications and allergies and updated as indicated    PHYSICAL EXAM:    Blood pressure 161/79, height 5' 10\" (1.778 m), weight 94 kg (207 lb 3.2 oz). Body mass index is 29.73 kg/m².  General Appearance: NAD, cooperative, alert  Eyes: Anicteric  ENT:  Normocephalic, atraumatic, normal mucosa.    Neck:  Supple, symmetrical, trachea midline  Resp:  Clear to auscultation bilaterally; no rales, rhonchi or wheezing; respirations unlabored   CV:  S1 S2, Regular rate and rhythm; no murmur, rub, or gallop.  GI:  Soft, non-tender, non-distended; normal bowel sounds; no masses, no organomegaly   Rectal: Deferred  Musculoskeletal: No cyanosis, clubbing or edema. Normal ROM.  Skin:  No jaundice, rashes, or lesions   Psych: Normal affect, good eye contact  Neuro: No gross deficits, AAOx3    Lab Results:   Lab Results   Component Value Date    WBC 7.73 08/30/2023    HGB 11.8 (L) 08/30/2023    HCT 37.1 08/30/2023    MCV 94 08/30/2023     08/30/2023     Lab Results   Component Value Date    K 4.3 02/08/2024     02/08/2024    CO2 25 02/08/2024    BUN 20 02/08/2024    CREATININE 1.23 02/08/2024    GLUF 89 08/30/2023    CALCIUM 8.0 (L) 08/30/2023    AST 28 08/18/2023    ALT 18 08/18/2023    ALKPHOS 54 04/21/2023    EGFR 64 02/08/2024       "

## 2024-08-16 NOTE — TELEPHONE ENCOUNTER
Our mutual patient is scheduled for procedure: Colonoscopy    On: _9___/_ 19   _/_   24 _ (but on cancellation list for sooner)     With:  _Jeffery_______    He/She is taking the following blood thinner:   Xarelto       Can this be stopped __2____ days prior to the procedure?      Physician Approving clearance: Ariadna/Nannette_______________________        Thank you,  Kathie WILSON/IRIS.  [facility]   [phone]

## 2024-08-29 ENCOUNTER — TELEPHONE (OUTPATIENT)
Dept: CARDIOLOGY CLINIC | Facility: CLINIC | Age: 69
End: 2024-08-29

## 2024-08-29 NOTE — TELEPHONE ENCOUNTER
Called [facility] ([facility]) for med pricing. Spoke to Braeden KIRKPATRICK Per insurance this is only an estimate based on patient's current benefits and are subject to change at anytime. Price may vary at pharmacy.     Eliquis 5 mg, # 180 - Copay:  $424.60 (retail pharmacy)   $424.50 (mail order pharmacy)    Xarelto 20 mg, # 90 - Copay:  $406.28 (retail pharmacy)   $406.18 (mail order pharmacy)    Dabigatran 150 mg, # 180 - Copay:  $254.65 (retail pharmacy)   $237.49 (mail order pharmacy)    Call reference # [id number]    All meds seem to be pretty expensive. Not sure if pt is ok going on Coumadin or if he would be a candidate for Watchman.    Called pt and l/m requesting call back to discuss pricing.     Please review.

## 2024-08-30 DIAGNOSIS — I48.92 ATRIAL FLUTTER, UNSPECIFIED TYPE (HCC): ICD-10-CM

## 2024-08-30 NOTE — TELEPHONE ENCOUNTER
Requested medication(s) are due for refill today: Yes  Patient has already received a courtesy refill: No  Other reason request has been forwarded to provider: 1M of samples provided to pt.

## 2024-08-30 NOTE — TELEPHONE ENCOUNTER
Pt called back. Reviewed pricing info w/ him. Pt said he will stick w/ Xarelto since he's had no side effects. He will p/u samples sometime next week, but asked that Rx also be sent to his local pharmacy.     Sent Rx to provider for approval.

## 2024-08-30 NOTE — TELEPHONE ENCOUNTER
JT/m from yesterday --    Hi, this is Yves Valencia. I was born on May 14th 1955. Wanted to answer the young lady that called me about medications. I understand from her message that both Eliquis and Xarelto are about the same. I would say please send in a prescription for Xarelto. Then once again, I would say let's continue with Xarelto. I could be reached at [phone] and I will try and call again tomorrow. Thank you. Bye.

## 2024-08-30 NOTE — TELEPHONE ENCOUNTER
Samples of this drug were given to the patient, quantity 28 tablets (4 bottles), Lot Number [id number]. The following was discussed with the patient as indicated: administration, storage, potential interactions, side effects.

## 2024-08-30 NOTE — TELEPHONE ENCOUNTER
Requested medication(s) are due for refill today: Yes  Patient has already received a courtesy refill: No  Other reason request has been forwarded to provider: Pt wants Rx to local pharmacy for when samples run out.

## 2024-09-05 ENCOUNTER — TELEPHONE (OUTPATIENT)
Dept: GASTROENTEROLOGY | Facility: CLINIC | Age: 69
End: 2024-09-05

## 2024-09-05 NOTE — TELEPHONE ENCOUNTER
Procedure confirmed  Colonoscopy     Via: Voice mail    Instructions given: Given to Patient at Visit     Prep Given: Miralax/Dulcolax    Confirmed last dose of Xarelto will be 9/16/24    Call the office if there are any questions.

## 2024-09-19 ENCOUNTER — ANESTHESIA (OUTPATIENT)
Dept: GASTROENTEROLOGY | Facility: HOSPITAL | Age: 69
End: 2024-09-19
Payer: MEDICARE

## 2024-09-19 ENCOUNTER — ANESTHESIA EVENT (OUTPATIENT)
Dept: GASTROENTEROLOGY | Facility: HOSPITAL | Age: 69
End: 2024-09-19
Payer: MEDICARE

## 2024-09-19 ENCOUNTER — HOSPITAL ENCOUNTER (OUTPATIENT)
Dept: GASTROENTEROLOGY | Facility: HOSPITAL | Age: 69
Setting detail: OUTPATIENT SURGERY
End: 2024-09-19
Payer: MEDICARE

## 2024-09-19 VITALS
HEART RATE: 50 BPM | SYSTOLIC BLOOD PRESSURE: 132 MMHG | TEMPERATURE: 97.5 F | OXYGEN SATURATION: 99 % | HEIGHT: 71 IN | WEIGHT: 206 LBS | RESPIRATION RATE: 14 BRPM | BODY MASS INDEX: 28.84 KG/M2 | DIASTOLIC BLOOD PRESSURE: 64 MMHG

## 2024-09-19 DIAGNOSIS — R19.4 CHANGE IN BOWEL HABITS: ICD-10-CM

## 2024-09-19 DIAGNOSIS — Z86.0100 HISTORY OF COLON POLYPS: ICD-10-CM

## 2024-09-19 PROCEDURE — 45380 COLONOSCOPY AND BIOPSY: CPT | Performed by: STUDENT IN AN ORGANIZED HEALTH CARE EDUCATION/TRAINING PROGRAM

## 2024-09-19 PROCEDURE — 88305 TISSUE EXAM BY PATHOLOGIST: CPT | Performed by: PATHOLOGY

## 2024-09-19 PROCEDURE — 88342 IMHCHEM/IMCYTCHM 1ST ANTB: CPT | Performed by: PATHOLOGY

## 2024-09-19 RX ORDER — GLYCOPYRROLATE 0.2 MG/ML
INJECTION INTRAMUSCULAR; INTRAVENOUS AS NEEDED
Status: DISCONTINUED | OUTPATIENT
Start: 2024-09-19 | End: 2024-09-19

## 2024-09-19 RX ORDER — SODIUM CHLORIDE, SODIUM LACTATE, POTASSIUM CHLORIDE, CALCIUM CHLORIDE 600; 310; 30; 20 MG/100ML; MG/100ML; MG/100ML; MG/100ML
INJECTION, SOLUTION INTRAVENOUS CONTINUOUS PRN
Status: DISCONTINUED | OUTPATIENT
Start: 2024-09-19 | End: 2024-09-19

## 2024-09-19 RX ORDER — PROPOFOL 10 MG/ML
INJECTION, EMULSION INTRAVENOUS AS NEEDED
Status: DISCONTINUED | OUTPATIENT
Start: 2024-09-19 | End: 2024-09-19

## 2024-09-19 RX ORDER — LIDOCAINE HYDROCHLORIDE 10 MG/ML
INJECTION, SOLUTION EPIDURAL; INFILTRATION; INTRACAUDAL; PERINEURAL AS NEEDED
Status: DISCONTINUED | OUTPATIENT
Start: 2024-09-19 | End: 2024-09-19

## 2024-09-19 RX ADMIN — GLYCOPYRROLATE 0.2 MCG: 0.2 INJECTION INTRAMUSCULAR; INTRAVENOUS at 13:47

## 2024-09-19 RX ADMIN — PROPOFOL 50 MG: 10 INJECTION, EMULSION INTRAVENOUS at 13:53

## 2024-09-19 RX ADMIN — PROPOFOL 20 MG: 10 INJECTION, EMULSION INTRAVENOUS at 13:58

## 2024-09-19 RX ADMIN — PROPOFOL 50 MG: 10 INJECTION, EMULSION INTRAVENOUS at 13:47

## 2024-09-19 RX ADMIN — SODIUM CHLORIDE, SODIUM LACTATE, POTASSIUM CHLORIDE, AND CALCIUM CHLORIDE: .6; .31; .03; .02 INJECTION, SOLUTION INTRAVENOUS at 13:34

## 2024-09-19 RX ADMIN — LIDOCAINE HYDROCHLORIDE 50 MG: 10 INJECTION, SOLUTION EPIDURAL; INFILTRATION; INTRACAUDAL; PERINEURAL at 13:41

## 2024-09-19 RX ADMIN — GLYCOPYRROLATE 0.2 MCG: 0.2 INJECTION INTRAMUSCULAR; INTRAVENOUS at 13:42

## 2024-09-19 RX ADMIN — PROPOFOL 50 MG: 10 INJECTION, EMULSION INTRAVENOUS at 13:42

## 2024-09-19 RX ADMIN — PROPOFOL 50 MG: 10 INJECTION, EMULSION INTRAVENOUS at 13:41

## 2024-09-19 NOTE — H&P
History and Physical - [facility]  Yves Valencia Jr. 69 y.o. male MRN: [id number]    HPI: Yves Valencia Jr. is a 69 y.o. male who presents for colonoscopy.    REVIEW OF SYSTEMS: Per the HPI, and otherwise unremarkable.    Historical Information   Past Medical History:   Diagnosis Date    Colon polyp     GERD (gastroesophageal reflux disease)     Hyperlipidemia     Hypertension     Seasonal allergies     Thyroid disease      Past Surgical History:   Procedure Laterality Date    ACHILLES TENDON REPAIR Left     CARDIAC ELECTROPHYSIOLOGY PROCEDURE N/A 8/29/2023    Procedure: Cardiac eps/aflutter ablation;  Surgeon: Jony Brunson DO;  Location: [facility];  Service: Cardiology    COLONOSCOPY       Social History   Social History     Substance and Sexual Activity   Alcohol Use Yes    Comment: occasional     Social History     Substance and Sexual Activity   Drug Use Never     Social History     Tobacco Use   Smoking Status Never   Smokeless Tobacco Never     Family History   Problem Relation Age of Onset    Colon cancer Father     Diabetes Brother        Meds/Allergies       Current Outpatient Medications:     amLODIPine (NORVASC) 2.5 mg tablet    candesartan (ATACAND) 32 MG tablet    fexofenadine (ALLEGRA) 60 MG tablet    finasteride (PROSCAR) 5 mg tablet    hydrochlorothiazide (HYDRODIURIL) 25 mg tablet    ibuprofen (MOTRIN) 200 mg tablet    levothyroxine 150 mcg tablet    Multiple Vitamins-Minerals (Mens 50+ Multivitamin) TABS    omeprazole (PriLOSEC) 20 mg delayed release capsule    QUEtiapine (SEROquel) 50 mg tablet    simvastatin (ZOCOR) 5 MG tablet    albuterol (PROVENTIL HFA,VENTOLIN HFA) 90 mcg/act inhaler    amoxicillin (AMOXIL) 500 mg capsule    fluticasone (FLONASE) 50 mcg/act nasal spray    oxymetazoline (AFRIN) 0.05 % nasal spray    rivaroxaban (Xarelto) 20 mg tablet    No Known Allergies    Objective     BP (!) 184/88   Pulse (!) 44   Temp 97.5 °F (36.4 °C) (Temporal)    "Resp 16   Ht 5' 10.5\" (1.791 m)   Wt 93.4 kg (206 lb)   SpO2 100%   BMI 29.14 kg/m²     PHYSICAL EXAM    General Appearance: NAD, cooperative, alert  Eyes: Anicteric  GI:  Soft, non-tender, non-distended; normal bowel sounds; no masses, no organomegaly   Rectal: Deferred until procedure  Musculoskeletal: No edema.  Skin:  No jaundice    ASSESSMENT/PLAN:  This is a 69 y.o. year old male here for colonoscopy, and he is stable and optimized for his procedure.        "

## 2024-09-19 NOTE — ANESTHESIA PREPROCEDURE EVALUATION
Procedure:  COLONOSCOPY    Relevant Problems   CARDIO   (+) Atrial fibrillation (HCC)   (+) Atrial flutter (HCC)   (+) Hyperlipidemia   (+) Hypertension      ENDO   (+) Hypothyroidism      GI/HEPATIC   (+) GERD (gastroesophageal reflux disease)        Physical Exam    Airway    Mallampati score: III  TM Distance: <3 FB  Neck ROM: full     Dental   No notable dental hx     Cardiovascular      Pulmonary      Other Findings        Anesthesia Plan  ASA Score- 3     Anesthesia Type- IV sedation with anesthesia with ASA Monitors.         Additional Monitors:     Airway Plan:            Plan Factors-    Chart reviewed.    Patient summary reviewed.    Patient is not a current smoker.              Induction- intravenous.    Postoperative Plan-         Informed Consent- Anesthetic plan and risks discussed with patient.  I personally reviewed this patient with the CRNA. Discussed and agreed on the Anesthesia Plan with the CRNA..

## 2024-09-19 NOTE — ANESTHESIA POSTPROCEDURE EVALUATION
Post-Op Assessment Note    CV Status:  Stable  Pain Score: 0    Pain management: adequate       Mental Status:  Sleepy   Hydration Status:  Euvolemic   PONV Controlled:  Controlled   Airway Patency:  Patent     Post Op Vitals Reviewed: Yes    No anethesia notable event occurred.    Staff: CRNA           /54 (09/19/24 1359)    Temp      Pulse (!) 47 (09/19/24 1359)   Resp 12 (09/19/24 1359)    SpO2 97 % (09/19/24 1359)

## 2024-09-24 PROCEDURE — 88342 IMHCHEM/IMCYTCHM 1ST ANTB: CPT | Performed by: PATHOLOGY

## 2024-09-24 PROCEDURE — 88305 TISSUE EXAM BY PATHOLOGIST: CPT | Performed by: PATHOLOGY

## 2024-09-25 ENCOUNTER — TELEPHONE (OUTPATIENT)
Dept: GASTROENTEROLOGY | Facility: CLINIC | Age: 69
End: 2024-09-25

## 2024-09-25 NOTE — TELEPHONE ENCOUNTER
Pt returned the Dr's call, because I had no notes for the results themselves , I reached out to the office and spoke with Carey to see if this is something the Dr wanted to give himself. She advised that is probably the case but that the Dr had left for the day but would be back in tomorrow. I let the Pt know and he says that is fine but asks if its possible, for the Dr to call between 12pm and 3pm as he will be out running errands before and after that an may not have his phone.   He also asked I note that when [facility]  calls him, it seems to go straight to  for him instead of ringing and so he often misses the calls so if the Dr doesn't connect with him, he will try to return the call asap, he doesn't want him to think he is avoiding calls from him.

## 2024-09-26 DIAGNOSIS — K52.832 LYMPHOCYTIC COLITIS: Primary | ICD-10-CM

## 2024-09-26 RX ORDER — BUDESONIDE 3 MG/1
CAPSULE, COATED PELLETS ORAL
Qty: 180 CAPSULE | Refills: 0 | Status: SHIPPED | OUTPATIENT
Start: 2024-09-26 | End: 2024-12-25

## 2024-09-26 NOTE — PROGRESS NOTES
Budesonide sent for lymphocytic colitis.  It is noted he is on candesartan.  This can also cause microscopic colitis.  We discussed that if he does not have relief after a full course of budesonide, he can discuss with his primary care/cardiologist regarding switching off candesartan.

## 2024-11-07 ENCOUNTER — TELEPHONE (OUTPATIENT)
Age: 69
End: 2024-11-07

## 2024-11-07 NOTE — TELEPHONE ENCOUNTER
Pt called in - missed Alba's call .. Sent task to reach back out. Said has had problems with all  [facility] calls going directly to [facility] - he is requesting we try him on home # instead (confirmed) ... Or to reach out via [facility]

## 2024-11-07 NOTE — TELEPHONE ENCOUNTER
"Please see 11/4/24 [facility] Message.     Pt providing update. Pt is on budesonide 6 mg dose. States he has not noted a significant improvement with diarrhea. Continues muscle cramps and having 3-4 BM a day \"with splat\"; some stools are more formed.      Questioning need for magnesium supplement or labs. Please advise.    Would like a call at cell [phone]      "

## 2024-11-20 ENCOUNTER — OFFICE VISIT (OUTPATIENT)
Dept: GASTROENTEROLOGY | Facility: CLINIC | Age: 69
End: 2024-11-20
Payer: MEDICARE

## 2024-11-20 VITALS
WEIGHT: 206 LBS | SYSTOLIC BLOOD PRESSURE: 122 MMHG | HEIGHT: 70 IN | BODY MASS INDEX: 29.49 KG/M2 | DIASTOLIC BLOOD PRESSURE: 78 MMHG

## 2024-11-20 DIAGNOSIS — K52.832 LYMPHOCYTIC COLITIS: Primary | ICD-10-CM

## 2024-11-20 DIAGNOSIS — K52.9 CHRONIC DIARRHEA: ICD-10-CM

## 2024-11-20 PROCEDURE — G2211 COMPLEX E/M VISIT ADD ON: HCPCS | Performed by: STUDENT IN AN ORGANIZED HEALTH CARE EDUCATION/TRAINING PROGRAM

## 2024-11-20 PROCEDURE — 99214 OFFICE O/P EST MOD 30 MIN: CPT | Performed by: STUDENT IN AN ORGANIZED HEALTH CARE EDUCATION/TRAINING PROGRAM

## 2024-11-20 RX ORDER — CHOLESTYRAMINE 4 G/9G
1 POWDER, FOR SUSPENSION ORAL 2 TIMES DAILY WITH MEALS
Qty: 180 PACKET | Refills: 0 | Status: SHIPPED | OUTPATIENT
Start: 2024-11-20 | End: 2025-02-18

## 2024-11-20 NOTE — PROGRESS NOTES
Name: Yves Valencia Jr.      : 1955      MRN: [id number]  Encounter Provider: Rey Stein DO  Encounter Date: 2024   Encounter department: [facility]  :  Assessment & Plan  Lymphocytic colitis  Lymphocytic colitis seen on biopsies but interestingly no relief with budesonide.  Given that he had no relief with 9 mg or 6 mg, there is no need to taper further and can discontinue altogether.  He was on Pepto at nighttime without relief as well.  As such we will go to cholestyramine twice daily to see if we can help bind up his stools and lessen his diarrhea.  Advise that he can go up to 4 times daily as needed.  Due to mixture of solid and loose stool, other considerations include constipation with overflow and will get an x-ray to assess for stool burden.  Orders:    XR abdomen 1 view kub; Future    cholestyramine (QUESTRAN) 4 g packet; Take 1 packet (4 g total) by mouth 2 (two) times a day with meals    Chronic diarrhea  As above, chronic.  Potential medications which can affect his colitis include his candesartan and omeprazole.  If symptoms do not respond to the cholestyramine and no evidence of significant stool burden on x-ray, could ask his primary care doctor if candesartan can be changed to a different medicine.  Could also consider changing his omeprazole to Pepcid if he can tolerate.  Will defer any medication changes for now until we can assess his response to cholestyramine.           History of Present Illness     HPI  Yves is a 69-year-old male with history of chronic diarrhea who had a colonoscopy in September notable for lymphocytic colitis.  There were no polyps seen.  He was started on budesonide at time and is down to 6 mg daily but has had no change in his bowel movements except for maybe a little bit decreased in frequency.  His stools are mixture of solid and loose components.  There is no blood in his stool.  He has been taking Pepto  at night without any relief as well.  He does have some abdominal bloating and gas with this.  No straining or blood.  There is been no recent medication changes.  He is chronically on omeprazole and states that if he misses any doses he will have significant heartburn.  Other medications include candesartan.    Review of Systems  Current Outpatient Medications on File Prior to Visit   Medication Sig Dispense Refill    albuterol (PROVENTIL HFA,VENTOLIN HFA) 90 mcg/act inhaler Inhale 2 puffs every 6 (six) hours as needed for wheezing      amLODIPine (NORVASC) 2.5 mg tablet Take 1 tablet (2.5 mg total) by mouth daily 90 tablet 3    amoxicillin (AMOXIL) 500 mg capsule Take 2,000 mg by mouth if needed Prior to dental work      candesartan (ATACAND) 32 MG tablet TAKE 1 TABLET BY MOUTH DAILY. 90 tablet 1    fexofenadine (ALLEGRA) 60 MG tablet if needed      finasteride (PROSCAR) 5 mg tablet Take 5 mg by mouth daily      hydrochlorothiazide (HYDRODIURIL) 25 mg tablet Take 1 tablet (25 mg total) by mouth daily 90 tablet 3    levothyroxine 150 mcg tablet TAKE 1 TABLET BY MOUTH EVERY DAY IN THE MORNING ON EMPTY STOMACH      Multiple Vitamins-Minerals (Mens 50+ Multivitamin) TABS Take by mouth      omeprazole (PriLOSEC) 20 mg delayed release capsule Take 20 mg by mouth daily      oxymetazoline (AFRIN) 0.05 % nasal spray 2 sprays by Each Nare route 2 (two) times a day      QUEtiapine (SEROquel) 50 mg tablet TAKE 1 TABLET BY MOUTH EVERY DAY AT BEDTIME FOR 30 DAYS      rivaroxaban (Xarelto) 20 mg tablet Take 1 tablet (20 mg total) by mouth daily with breakfast 30 tablet 11    simvastatin (ZOCOR) 5 MG tablet Take 1 tablet (5 mg total) by mouth daily at bedtime 90 tablet 3    [DISCONTINUED] budesonide (ENTOCORT EC) 3 MG capsule Take 3 capsules (9 mg total) by mouth daily for 30 days, THEN 2 capsules (6 mg total) daily for 30 days, THEN 1 capsule (3 mg total) daily. 180 capsule 0    fluticasone (FLONASE) 50 mcg/act nasal spray if  "needed      ibuprofen (MOTRIN) 200 mg tablet        No current facility-administered medications on file prior to visit.      Social History     Tobacco Use    Smoking status: Never    Smokeless tobacco: Never   Vaping Use    Vaping status: Never Used   Substance and Sexual Activity    Alcohol use: Yes     Comment: occasional    Drug use: Never    Sexual activity: Not on file        Objective   /78   Ht 5' 10\" (1.778 m)   Wt 93.4 kg (206 lb)   BMI 29.56 kg/m²      Physical Exam  General Appearance: NAD, cooperative, alert  Eyes: Anicteric  GI:  Soft, non-tender, non-distended; normal bowel sounds; no masses, no organomegaly   Rectal: Deferred  Musculoskeletal: No edema.  Skin:     No jaundice    "

## 2024-12-06 ENCOUNTER — OFFICE VISIT (OUTPATIENT)
Dept: CARDIOLOGY CLINIC | Facility: CLINIC | Age: 69
End: 2024-12-06
Payer: MEDICARE

## 2024-12-06 ENCOUNTER — TELEPHONE (OUTPATIENT)
Dept: CARDIOLOGY CLINIC | Facility: CLINIC | Age: 69
End: 2024-12-06

## 2024-12-06 VITALS
HEIGHT: 70 IN | DIASTOLIC BLOOD PRESSURE: 76 MMHG | SYSTOLIC BLOOD PRESSURE: 110 MMHG | WEIGHT: 203 LBS | BODY MASS INDEX: 29.06 KG/M2 | HEART RATE: 42 BPM

## 2024-12-06 DIAGNOSIS — I48.11 LONGSTANDING PERSISTENT ATRIAL FIBRILLATION (HCC): ICD-10-CM

## 2024-12-06 DIAGNOSIS — I10 HYPERTENSION, UNSPECIFIED TYPE: ICD-10-CM

## 2024-12-06 DIAGNOSIS — I48.92 ATRIAL FLUTTER, UNSPECIFIED TYPE (HCC): Primary | ICD-10-CM

## 2024-12-06 DIAGNOSIS — I82.A29 CHRONIC DEEP VEIN THROMBOSIS (DVT) OF AXILLARY VEIN, UNSPECIFIED LATERALITY (HCC): ICD-10-CM

## 2024-12-06 DIAGNOSIS — I10 PRIMARY HYPERTENSION: ICD-10-CM

## 2024-12-06 DIAGNOSIS — I47.29 NSVT (NONSUSTAINED VENTRICULAR TACHYCARDIA) (HCC): ICD-10-CM

## 2024-12-06 DIAGNOSIS — E78.00 PURE HYPERCHOLESTEROLEMIA: ICD-10-CM

## 2024-12-06 PROBLEM — I82.91 CHRONIC DEEP VEIN THROMBOSIS (DVT) OF NON-EXTREMITY VEIN: Status: ACTIVE | Noted: 2024-12-06

## 2024-12-06 PROCEDURE — 99214 OFFICE O/P EST MOD 30 MIN: CPT | Performed by: INTERNAL MEDICINE

## 2024-12-06 PROCEDURE — 93000 ELECTROCARDIOGRAM COMPLETE: CPT | Performed by: INTERNAL MEDICINE

## 2024-12-06 RX ORDER — AMLODIPINE BESYLATE 10 MG/1
10 TABLET ORAL DAILY
Qty: 90 TABLET | Refills: 3 | Status: SHIPPED | OUTPATIENT
Start: 2024-12-06

## 2024-12-06 NOTE — PROGRESS NOTES
Cardiology Follow Up    Yves Barbosashyam Craft  1955  [id number]  [facility] [place] [address] [address]  [address] [id number] [phone]    1. Atrial flutter, unspecified type (HCC)  POCT ECG      2. NSVT (nonsustained ventricular tachycardia) (HCC)        3. Longstanding persistent atrial fibrillation (HCC)            Interval History: Followup atrial fibrillation    He had two episodes of lightheadedness.     No chest pain or shortness of breath. He exercises regularly and has some occasional fatigue afterwards.     Medical Problems       Problem List       GERD (gastroesophageal reflux disease)    Hyperlipidemia    Seasonal allergies    Hypothyroidism    Prostate hypertrophy    Atrial flutter (HCC)    Status post ablation of atrial flutter (CTI line) 8/29/2023    Atrial fibrillation (HCC)    Bradycardia    Hypertension    Asymptomatic bradycardia    Chronic deep vein thrombosis (DVT) of non-extremity vein        Past Medical History:   Diagnosis Date    Colon polyp     GERD (gastroesophageal reflux disease)     Hyperlipidemia     Hypertension     Seasonal allergies     Thyroid disease      Social History     Socioeconomic History    Marital status: /Civil Union     Spouse name: Not on file    Number of children: Not on file    Years of education: Not on file    Highest education level: Not on file   Occupational History    Not on file   Tobacco Use    Smoking status: Never    Smokeless tobacco: Never   Vaping Use    Vaping status: Never Used   Substance and Sexual Activity    Alcohol use: Yes     Comment: occasional    Drug use: Never    Sexual activity: Not on file   Other Topics Concern    Not on file   Social History Narrative    Not on file     Social Drivers of Health     Financial Resource Strain: Not on file   Food Insecurity: No Food Insecurity (8/30/2023)    Hunger Vital Sign     Worried About Running Out of  Food in the Last Year: Never true     Ran Out of Food in the Last Year: Never true   Transportation Needs: No Transportation Needs (8/30/2023)    PRAPARE - Transportation     Lack of Transportation (Medical): No     Lack of Transportation (Non-Medical): No   Physical Activity: Not on file   Stress: Not on file   Social Connections: Not on file   Intimate Partner Violence: Not on file   Housing Stability: Low Risk  (8/30/2023)    Housing Stability Vital Sign     Unable to Pay for Housing in the Last Year: No     Number of Times Moved in the Last Year: 1     Homeless in the Last Year: No      Family History   Problem Relation Age of Onset    Colon cancer Father     Diabetes Brother      Past Surgical History:   Procedure Laterality Date    ACHILLES TENDON REPAIR Left     CARDIAC ELECTROPHYSIOLOGY PROCEDURE N/A 08/29/2023    Procedure: Cardiac eps/aflutter ablation;  Surgeon: Jony Brunson DO;  Location: [facility];  Service: Cardiology    COLONOSCOPY  09/19/2024    COLONOSCOPY  01/29/2018       Current Outpatient Medications:     albuterol (PROVENTIL HFA,VENTOLIN HFA) 90 mcg/act inhaler, Inhale 2 puffs every 6 (six) hours as needed for wheezing, Disp: , Rfl:     amLODIPine (NORVASC) 2.5 mg tablet, Take 1 tablet (2.5 mg total) by mouth daily, Disp: 90 tablet, Rfl: 3    amoxicillin (AMOXIL) 500 mg capsule, Take 2,000 mg by mouth if needed Prior to dental work, Disp: , Rfl:     candesartan (ATACAND) 32 MG tablet, TAKE 1 TABLET BY MOUTH DAILY., Disp: 90 tablet, Rfl: 1    cholestyramine (QUESTRAN) 4 g packet, Take 1 packet (4 g total) by mouth 2 (two) times a day with meals, Disp: 180 packet, Rfl: 0    fexofenadine (ALLEGRA) 60 MG tablet, if needed, Disp: , Rfl:     finasteride (PROSCAR) 5 mg tablet, Take 5 mg by mouth daily, Disp: , Rfl:     hydrochlorothiazide (HYDRODIURIL) 25 mg tablet, Take 1 tablet (25 mg total) by mouth daily, Disp: 90 tablet, Rfl: 3    ibuprofen (MOTRIN) 200 mg tablet, , Disp: , Rfl:      "levothyroxine 150 mcg tablet, TAKE 1 TABLET BY MOUTH EVERY DAY IN THE MORNING ON EMPTY STOMACH, Disp: , Rfl:     Multiple Vitamins-Minerals (Mens 50+ Multivitamin) TABS, Take by mouth, Disp: , Rfl:     omeprazole (PriLOSEC) 20 mg delayed release capsule, Take 20 mg by mouth daily, Disp: , Rfl:     oxymetazoline (AFRIN) 0.05 % nasal spray, 2 sprays by Each Nare route 2 (two) times a day, Disp: , Rfl:     QUEtiapine (SEROquel) 50 mg tablet, TAKE 1 TABLET BY MOUTH EVERY DAY AT BEDTIME FOR 30 DAYS, Disp: , Rfl:     rivaroxaban (Xarelto) 20 mg tablet, Take 1 tablet (20 mg total) by mouth daily with breakfast, Disp: 30 tablet, Rfl: 11    simvastatin (ZOCOR) 5 MG tablet, Take 1 tablet (5 mg total) by mouth daily at bedtime, Disp: 90 tablet, Rfl: 3  No Known Allergies    Labs:     Chemistry        Component Value Date/Time    K 4.3 02/08/2024 0735     02/08/2024 0735    CO2 25 02/08/2024 0735    BUN 20 02/08/2024 0735    CREATININE 1.23 02/08/2024 0735    CREATININE 0.94 08/30/2023 0606        Component Value Date/Time    CALCIUM 8.0 (L) 08/30/2023 0606    ALKPHOS 54 04/21/2023 1440    AST 28 08/18/2023 0810    ALT 18 08/18/2023 0810            No results found for: \"CHOL\"  No results found for: \"HDL\"  No results found for: \"LDLCALC\"  No results found for: \"TRIG\"  No results found for: \"CHOLHDL\"    Imaging: No results found.    EKG: Atrial fibrillation with junctional bradycardia. .    Review of Systems   Constitutional: Negative.   HENT: Negative.     Eyes: Negative.    Cardiovascular: Negative.    Respiratory: Negative.     Endocrine: Negative.    Hematologic/Lymphatic: Negative.    Skin: Negative.    Musculoskeletal: Negative.    Gastrointestinal: Negative.    Genitourinary: Negative.    Neurological: Negative.    Psychiatric/Behavioral: Negative.     Allergic/Immunologic: Negative.        Vitals:    12/06/24 1529   BP: 110/76   Pulse: (!) 42           Physical Exam  Vitals reviewed.   Constitutional:       " Appearance: Normal appearance.   HENT:      Head: Normocephalic.      Nose: Nose normal.      Mouth/Throat:      Mouth: Mucous membranes are moist.      Pharynx: Oropharynx is clear.   Eyes:      General: No scleral icterus.     Conjunctiva/sclera: Conjunctivae normal.   Cardiovascular:      Rate and Rhythm: Normal rate and regular rhythm.      Heart sounds: No murmur heard.     No friction rub. No gallop.   Pulmonary:      Effort: Pulmonary effort is normal. No respiratory distress.      Breath sounds: Normal breath sounds. No wheezing or rales.   Abdominal:      General: Abdomen is flat. Bowel sounds are normal. There is no distension.      Palpations: Abdomen is soft.      Tenderness: There is no abdominal tenderness. There is no guarding.   Musculoskeletal:      Cervical back: Normal range of motion and neck supple.      Right lower leg: No edema.      Left lower leg: No edema.   Skin:     General: Skin is warm and dry.   Neurological:      General: No focal deficit present.      Mental Status: He is alert and oriented to person, place, and time.   Psychiatric:         Mood and Affect: Mood normal.         Behavior: Behavior normal.         Discussion/Summary:    Persistent Atrial Fibrillation:  Junctional bradycardia with afib today. He does have some fatigue and has been following with EP as well.     He has followup with EP next week regarding possibility for pacemaker.     I have spent a total time of 25 minutes in caring for this patient on the day of the visit/encounter including Diagnostic results, Prognosis, Risks and benefits of tx options, Instructions for management, and Patient and family education.

## 2025-01-08 ENCOUNTER — OFFICE VISIT (OUTPATIENT)
Dept: CARDIOLOGY CLINIC | Facility: CLINIC | Age: 70
End: 2025-01-08
Payer: MEDICARE

## 2025-01-08 ENCOUNTER — TELEPHONE (OUTPATIENT)
Dept: CARDIOLOGY CLINIC | Facility: CLINIC | Age: 70
End: 2025-01-08

## 2025-01-08 VITALS
WEIGHT: 208.4 LBS | BODY MASS INDEX: 29.9 KG/M2 | SYSTOLIC BLOOD PRESSURE: 168 MMHG | HEART RATE: 47 BPM | DIASTOLIC BLOOD PRESSURE: 92 MMHG

## 2025-01-08 DIAGNOSIS — I10 HYPERTENSION, UNSPECIFIED TYPE: ICD-10-CM

## 2025-01-08 DIAGNOSIS — I48.19 PERSISTENT ATRIAL FIBRILLATION (HCC): ICD-10-CM

## 2025-01-08 DIAGNOSIS — R00.1 BRADYCARDIA: ICD-10-CM

## 2025-01-08 DIAGNOSIS — Z79.01 ANTICOAGULATION ADEQUATE: ICD-10-CM

## 2025-01-08 DIAGNOSIS — I82.A29 CHRONIC DEEP VEIN THROMBOSIS (DVT) OF AXILLARY VEIN, UNSPECIFIED LATERALITY (HCC): ICD-10-CM

## 2025-01-08 DIAGNOSIS — I48.3 TYPICAL ATRIAL FLUTTER (HCC): Primary | ICD-10-CM

## 2025-01-08 PROCEDURE — 99214 OFFICE O/P EST MOD 30 MIN: CPT | Performed by: INTERNAL MEDICINE

## 2025-01-08 PROCEDURE — 93000 ELECTROCARDIOGRAM COMPLETE: CPT | Performed by: INTERNAL MEDICINE

## 2025-01-08 NOTE — PROGRESS NOTES
EPS Progress Note - Yves Valencia Jr. 69 y.o. male MRN: [id number]           ASSESSMENT:  1. Typical atrial flutter (HCC)        2. Persistent atrial fibrillation (HCC)        3. Hypertension, unspecified type        4. Bradycardia        5. Anticoagulation adequate                PLAN:  Yves has bradycardia he has had it for quite some time and I have encouraged him to get a pacemaker.  He has had 3 episodes of lightheadedness/dizziness and he is finally at the point where he is ready to move forward with a pacemaker.  His resting heart rate is anywhere from 40 beats a minute to 50 beats a minute and I believe that part of the reason his blood pressure is high as vascular compensation heart rate.  I explained how the procedures performed risk benefits and alternatives in detail.  He did not take his blood pressure medicines today and I believe that once his pacemaker is placed I explained to him it is quite possible that his blood pressure will go down simply from increased cardiac output    Single-chamber Medtronic pacemaker will be arranged in the near future.        HPI:   Interim history he presents to discuss pacemaker.  He recently saw a cardiologist Dr. Narayan who recommended pacemaker placement.    He has longstanding persistent atrial fibrillation with essentially junctional escape rhythm at 40 to 50 bpm.          Review of Systems   Constitutional: Negative for chills and fever.   HENT:  Negative for congestion and sore throat.    Eyes:  Negative for blurred vision and double vision.   Cardiovascular:  Negative for chest pain, claudication, dyspnea on exertion, leg swelling, near-syncope, orthopnea, palpitations, paroxysmal nocturnal dyspnea and syncope.   Respiratory:  Negative for cough, shortness of breath and sputum production.    Hematologic/Lymphatic: Negative for bleeding problem. Does not bruise/bleed easily.   Skin:  Negative for itching and rash.   Musculoskeletal:  Negative for arthritis and  joint pain.   Gastrointestinal:  Negative for abdominal pain, nausea and vomiting.   Genitourinary:  Negative for hematuria.   Neurological:  Positive for dizziness. Negative for focal weakness, headaches, light-headedness and weakness.   Psychiatric/Behavioral:  Negative for depression. The patient is not nervous/anxious.         Objective:     Vitals: Blood pressure 168/92, pulse (!) 47, weight 94.5 kg (208 lb 6.4 oz)., Body mass index is 29.9 kg/m².,        Physical Exam:    GEN: Yves Valencia Jr. appears well, alert and oriented x 3, pleasant and cooperative   HEENT: pupils equal, round, and reactive to light; extraocular muscles intact  NECK: supple, no carotid bruits   HEART: regular rhythm, normal S1 and S2, no murmurs, clicks, gallops or rubs   LUNGS: clear to auscultation bilaterally; no wheezes, rales, or rhonchi   ABDOMEN: normal bowel sounds, soft, no tenderness, no distention  EXTREMITIES: peripheral pulses normal; no clubbing, cyanosis, or edema  NEURO: no focal findings   SKIN: normal without suspicious lesions on exposed skin    Medications:      Current Outpatient Medications:   •  albuterol (PROVENTIL HFA,VENTOLIN HFA) 90 mcg/act inhaler, Inhale 2 puffs every 6 (six) hours as needed for wheezing, Disp: , Rfl:   •  amLODIPine (NORVASC) 10 mg tablet, Take 1 tablet (10 mg total) by mouth daily, Disp: 90 tablet, Rfl: 3  •  amoxicillin (AMOXIL) 500 mg capsule, Take 2,000 mg by mouth if needed Prior to dental work, Disp: , Rfl:   •  cholestyramine (QUESTRAN) 4 g packet, Take 1 packet (4 g total) by mouth 2 (two) times a day with meals, Disp: 180 packet, Rfl: 0  •  fexofenadine (ALLEGRA) 60 MG tablet, if needed, Disp: , Rfl:   •  finasteride (PROSCAR) 5 mg tablet, Take 5 mg by mouth daily, Disp: , Rfl:   •  hydrochlorothiazide (HYDRODIURIL) 25 mg tablet, Take 1 tablet (25 mg total) by mouth daily, Disp: 90 tablet, Rfl: 3  •  ibuprofen (MOTRIN) 200 mg tablet, , Disp: , Rfl:   •  levothyroxine 150 mcg  tablet, TAKE 1 TABLET BY MOUTH EVERY DAY IN THE MORNING ON EMPTY STOMACH, Disp: , Rfl:   •  Loperamide HCl (IMODIUM PO), Take by mouth, Disp: , Rfl:   •  Multiple Vitamins-Minerals (Mens 50+ Multivitamin) TABS, Take by mouth, Disp: , Rfl:   •  oxymetazoline (AFRIN) 0.05 % nasal spray, 2 sprays by Each Nare route 2 (two) times a day, Disp: , Rfl:   •  QUEtiapine (SEROquel) 50 mg tablet, TAKE 1 TABLET BY MOUTH EVERY DAY AT BEDTIME FOR 30 DAYS, Disp: , Rfl:   •  rivaroxaban (Xarelto) 20 mg tablet, Take 1 tablet (20 mg total) by mouth daily with breakfast, Disp: 30 tablet, Rfl: 11  •  simvastatin (ZOCOR) 5 MG tablet, Take 1 tablet (5 mg total) by mouth daily at bedtime, Disp: 90 tablet, Rfl: 3  •  omeprazole (PriLOSEC) 20 mg delayed release capsule, Take 20 mg by mouth daily (Patient not taking: Reported on 1/8/2025), Disp: , Rfl:      Family History   Problem Relation Age of Onset   • Colon cancer Father    • Diabetes Brother      Social History     Socioeconomic History   • Marital status: /Civil Union     Spouse name: Not on file   • Number of children: Not on file   • Years of education: Not on file   • Highest education level: Not on file   Occupational History   • Not on file   Tobacco Use   • Smoking status: Never   • Smokeless tobacco: Never   Vaping Use   • Vaping status: Never Used   Substance and Sexual Activity   • Alcohol use: Yes     Comment: occasional   • Drug use: Never   • Sexual activity: Not on file   Other Topics Concern   • Not on file   Social History Narrative   • Not on file     Social Drivers of Health     Financial Resource Strain: Not on file   Food Insecurity: No Food Insecurity (8/30/2023)    Hunger Vital Sign    • Worried About Running Out of Food in the Last Year: Never true    • Ran Out of Food in the Last Year: Never true   Transportation Needs: No Transportation Needs (8/30/2023)    PRAPARE - Transportation    • Lack of Transportation (Medical): No    • Lack of Transportation  (Non-Medical): No   Physical Activity: Not on file   Stress: Not on file   Social Connections: Not on file   Intimate Partner Violence: Not on file   Housing Stability: Low Risk  (8/30/2023)    Housing Stability Vital Sign    • Unable to Pay for Housing in the Last Year: No    • Number of Times Moved in the Last Year: 1    • Homeless in the Last Year: No     Social History     Tobacco Use   Smoking Status Never   Smokeless Tobacco Never     Social History     Substance and Sexual Activity   Alcohol Use Yes    Comment: occasional       Labs & Results:  Below is the patient's most recent value for Albumin, ALT, AST, BUN, Calcium, Chloride, Cholesterol, CO2, Creatinine, GFR, Glucose, HDL, Hematocrit, Hemoglobin, Hemoglobin A1C, LDL, Magnesium, Phosphorus, Platelets, Potassium, PSA, Sodium, Triglycerides, and WBC.   Lab Results   Component Value Date    ALT 18 08/18/2023    AST 28 08/18/2023    BUN 20 02/08/2024    CALCIUM 8.0 (L) 08/30/2023     02/08/2024    CO2 25 02/08/2024    CREATININE 1.23 02/08/2024    HCT 37.1 08/30/2023    HGB 11.8 (L) 08/30/2023     08/30/2023    K 4.3 02/08/2024    WBC 7.73 08/30/2023     Note: for a comprehensive list of the patient's lab results, access the Results Review activity.            Cardiac testing:   No results found for this or any previous visit.    No results found for this or any previous visit.    No results found for this or any previous visit.    No results found for this or any previous visit.

## 2025-01-08 NOTE — TELEPHONE ENCOUNTER
Patient seen in office today by Dr. Brunson.    Dr. Brunson provided Xarelo Samples    Xarelto 20mg  Lot# [id number]  Exp 01/27  4 bottles

## 2025-01-09 ENCOUNTER — PATIENT MESSAGE (OUTPATIENT)
Dept: CARDIOLOGY CLINIC | Facility: CLINIC | Age: 70
End: 2025-01-09

## 2025-01-09 ENCOUNTER — PREP FOR PROCEDURE (OUTPATIENT)
Dept: CARDIOLOGY CLINIC | Facility: CLINIC | Age: 70
End: 2025-01-09

## 2025-01-09 ENCOUNTER — TELEPHONE (OUTPATIENT)
Dept: CARDIOLOGY CLINIC | Facility: CLINIC | Age: 70
End: 2025-01-09

## 2025-01-09 DIAGNOSIS — I48.3 TYPICAL ATRIAL FLUTTER (HCC): Primary | ICD-10-CM

## 2025-01-09 DIAGNOSIS — R00.1 BRADYCARDIA: Primary | ICD-10-CM

## 2025-01-09 DIAGNOSIS — I48.19 PERSISTENT ATRIAL FIBRILLATION (HCC): ICD-10-CM

## 2025-01-09 NOTE — TELEPHONE ENCOUNTER
----- Message from Jony Brunson DO sent at 1/8/2025 10:51 AM EST -----  Please schedule ventricular pacemaker insertion with [employer].  Hold Xarelto the morning of procedure only he can take it the day before.    Patient would like it done within the next month or 2 thank you

## 2025-01-09 NOTE — LETTER
DEVICE PROCEDURE INSTRUCTIONS    Yves Valencia JrMaria A   : 1955  MRN: [id number]  [address] [place] [address] Dr Sierra FRANK [id number]    Procedure: Pacer Device Implant    Procedure Date: 2025    Location: Critical access hospital  Address: [address] [address]    Please call to Short Stay Center [phone] if not get call by 5.00pm.      Labs to be done AFTER 2025 BEFORE  2025 CMP /  CBC (FASTING 8 HOURS)    BLOOD THINNER INSTRUCTIONS (Coumadin / Warfarin / Pradaxa / Eliquis / Xarelto):   XARELTO: DO NOT take this medication the the morning of the procedure.     Medication Hold:   HYDROCHLORTHIAZIDE: DO NOT take this medication the morning of the procedure.       Arrival Time: The Hospital will contact you the day prior to your procedure, usually between 4PM - 6PM to instruct you on the time and place to report. If you do not hear from a [facility]  by 6PM the evening prior to your procedure, please contact the campus you are scheduled at.     You may have NOTHING to eat or drink from midnight the night prior to your procedure including candy & gum.  You may have a SIP of water with your morning medications.   DO NOT stop taking Plavix or Aspirin unless advised otherwise.    Arrange for a responsible adult to drive you to and from the hospital.    Please notify us if you got a  NEW MEDICATION prescribed prior to your procedure or have been admitted to the hospital within 30 days.     You should continue to take your morning medications with a sip of water UNLESS ADVISED OTHERWISE.       DO NOT stop taking Plavix or Aspirin unless advised otherwise.     If you are currently taking Fish Oil, Krill oil and/or Vitamin E please DO NOT TAKE FOR A WEEK PRIOR TO PROCEDURE.     If you are diabetic, DO NOT take any of your diabetic pills the morning of your procedure. Oral diabetic medications may include Glucophage, Prandin, Glyburide, Micronase, Avandia, Glucovance,  Precose, Glynase, and Starlix.     Bring a list of daily medications, vitamins, minerals, herbals and nutritional supplements you take. Please include dosages and the times you take them each day.     If you are packing an overnight bag, pack minimal clothing, you will be given hospital sleepwear.   DO NOT bring money, valuables or jewelry. The wedding band is ok.     If you use CPAP machine, bring it to the hospital.     Bring your Photo ID and Insurance cards with you.       Pre-Operative Showering Instructions. ONLY FOR DEVICE PROCEDURE.    The two nights prior to your procedure, take a shower each night using the special antiseptic body scrub (Chlorhexidine Scrub Brush) you received from the office or hospital. This scrub will replace your soap at home, the sponge is pre-filled with soap.     The scrub brushes are single use only and should be discarded after use.     Shampoo your hair with regular shampoo and rinse completely before using the antiseptic scrub brush.     Using the sponge side of the scrub brush to wash your entire body from your neck down, with special attention to your armpits, chest and groin area. DO NOT USE the scrub on your face and avoid any open wounds. Do not use any other soap or wash your skin.    DO NOT USE any lotion, powder, deodorant or perfume of any kind on your skin after you shower.    AFTER THE FIRST NIGHT of using the scrub, change your bed linen sheets to a fresh clean set and clean pajamas for bedtime.    AFTER THE SECOND NIGHT of using the scrub, use clean pajamas for bedtime.    DO NOT SHOWER THE MORNING OF SURGERY, you will be prepped and cleansed at the hospital prior to your procedure.     If you develop a cold, sore throat, fever or any other illness prior to your procedure date, notify your surgeon immediately      PLEASE  THE [facility] AT YOUR MOST CONVENIENT [facility] CARDIOLOGY OFFICE.      FAILURE TO FOLLOW ANY OF THESE INSTRUCTIONS COULD RESULT IN THE  CANCELLATION OF YOUR PROCEDURE      Please call  [phone] ([place]) or [phone] ([place]) if you do not hear from the  by 6:00PM the night before your procedure.    All patients enter through ENTRANCE B.  Parking is available free of charge or park on Parking Deck B.        Thank you,   Kiesha Whitten  Surgery Coordinator  [facility]   [address] [place] [address]  [place] [address]  Ph: [phone]

## 2025-01-09 NOTE — TELEPHONE ENCOUNTER
Patient scheduled for   single chamber pacer  at [facility] on   3/20/2025 with Dr Brunson.  Patient aware of general instructions, labs test required.   Meds holds:   Xarelto to hold morning of.  HCTZ hold the morning of.

## 2025-02-11 DIAGNOSIS — K52.832 LYMPHOCYTIC COLITIS: ICD-10-CM

## 2025-02-12 RX ORDER — CHOLESTYRAMINE 4 G/9G
POWDER, FOR SUSPENSION ORAL
Qty: 60 PACKET | Refills: 0 | Status: SHIPPED | OUTPATIENT
Start: 2025-02-12

## 2025-02-25 LAB
ALBUMIN SERPL-MCNC: 4.4 G/DL (ref 3.9–4.9)
ALP SERPL-CCNC: 66 IU/L (ref 44–121)
ALT SERPL-CCNC: 18 IU/L (ref 0–44)
AST SERPL-CCNC: 29 IU/L (ref 0–40)
BASOPHILS # BLD AUTO: 0.1 X10E3/UL (ref 0–0.2)
BASOPHILS NFR BLD AUTO: 1 %
BILIRUB SERPL-MCNC: 1.6 MG/DL (ref 0–1.2)
BUN SERPL-MCNC: 20 MG/DL (ref 8–27)
BUN/CREAT SERPL: 16 (ref 10–24)
CALCIUM SERPL-MCNC: 9.8 MG/DL (ref 8.6–10.2)
CHLORIDE SERPL-SCNC: 101 MMOL/L (ref 96–106)
CO2 SERPL-SCNC: 25 MMOL/L (ref 20–29)
CREAT SERPL-MCNC: 1.28 MG/DL (ref 0.76–1.27)
EGFR: 60 ML/MIN/1.73
EOSINOPHIL # BLD AUTO: 0.1 X10E3/UL (ref 0–0.4)
EOSINOPHIL NFR BLD AUTO: 2 %
ERYTHROCYTE [DISTWIDTH] IN BLOOD BY AUTOMATED COUNT: 11.9 % (ref 11.6–15.4)
GLOBULIN SER-MCNC: 2.3 G/DL (ref 1.5–4.5)
GLUCOSE SERPL-MCNC: 98 MG/DL (ref 70–99)
HCT VFR BLD AUTO: 45.7 % (ref 37.5–51)
HGB BLD-MCNC: 15.2 G/DL (ref 13–17.7)
IMM GRANULOCYTES # BLD: 0 X10E3/UL (ref 0–0.1)
IMM GRANULOCYTES NFR BLD: 0 %
LYMPHOCYTES # BLD AUTO: 1.7 X10E3/UL (ref 0.7–3.1)
LYMPHOCYTES NFR BLD AUTO: 21 %
MCH RBC QN AUTO: 30.8 PG (ref 26.6–33)
MCHC RBC AUTO-ENTMCNC: 33.3 G/DL (ref 31.5–35.7)
MCV RBC AUTO: 93 FL (ref 79–97)
MONOCYTES # BLD AUTO: 0.7 X10E3/UL (ref 0.1–0.9)
MONOCYTES NFR BLD AUTO: 9 %
NEUTROPHILS # BLD AUTO: 5.3 X10E3/UL (ref 1.4–7)
NEUTROPHILS NFR BLD AUTO: 67 %
PLATELET # BLD AUTO: 267 X10E3/UL (ref 150–450)
POTASSIUM SERPL-SCNC: 3.6 MMOL/L (ref 3.5–5.2)
PROT SERPL-MCNC: 6.7 G/DL (ref 6–8.5)
RBC # BLD AUTO: 4.93 X10E6/UL (ref 4.14–5.8)
SODIUM SERPL-SCNC: 143 MMOL/L (ref 134–144)
WBC # BLD AUTO: 7.9 X10E3/UL (ref 3.4–10.8)

## 2025-03-11 DIAGNOSIS — K52.832 LYMPHOCYTIC COLITIS: ICD-10-CM

## 2025-03-11 RX ORDER — CHOLESTYRAMINE 4 G/9G
POWDER, FOR SUSPENSION ORAL
Qty: 180 PACKET | Refills: 1 | Status: SHIPPED | OUTPATIENT
Start: 2025-03-11

## 2025-03-18 RX ORDER — SODIUM CHLORIDE 9 MG/ML
20 INJECTION, SOLUTION INTRAVENOUS CONTINUOUS
Status: CANCELLED | OUTPATIENT
Start: 2025-03-18

## 2025-03-18 RX ORDER — SODIUM CHLORIDE 9 MG/ML
20 INJECTION, SOLUTION INTRAVENOUS ONCE
Status: CANCELLED | OUTPATIENT
Start: 2025-03-18 | End: 2025-03-18

## 2025-03-18 RX ORDER — PANTOPRAZOLE SODIUM 40 MG/10ML
40 INJECTION, POWDER, LYOPHILIZED, FOR SOLUTION INTRAVENOUS ONCE
Status: CANCELLED | OUTPATIENT
Start: 2025-03-18 | End: 2025-03-18

## 2025-03-19 ENCOUNTER — ANESTHESIA EVENT (OUTPATIENT)
Dept: NON INVASIVE DIAGNOSTICS | Facility: HOSPITAL | Age: 70
End: 2025-03-19
Payer: MEDICARE

## 2025-03-20 ENCOUNTER — HOSPITAL ENCOUNTER (OUTPATIENT)
Facility: HOSPITAL | Age: 70
Setting detail: OUTPATIENT SURGERY
Discharge: HOME | End: 2025-03-20
Attending: INTERNAL MEDICINE | Admitting: INTERNAL MEDICINE
Payer: MEDICARE

## 2025-03-20 ENCOUNTER — ANESTHESIA (OUTPATIENT)
Dept: NON INVASIVE DIAGNOSTICS | Facility: HOSPITAL | Age: 70
End: 2025-03-20
Payer: MEDICARE

## 2025-03-20 ENCOUNTER — APPOINTMENT (OUTPATIENT)
Dept: RADIOLOGY | Facility: HOSPITAL | Age: 70
End: 2025-03-20
Payer: MEDICARE

## 2025-03-20 VITALS
DIASTOLIC BLOOD PRESSURE: 76 MMHG | BODY MASS INDEX: 29.83 KG/M2 | TEMPERATURE: 97.4 F | SYSTOLIC BLOOD PRESSURE: 140 MMHG | HEART RATE: 60 BPM | RESPIRATION RATE: 16 BRPM | HEIGHT: 70 IN | WEIGHT: 208.34 LBS | OXYGEN SATURATION: 96 %

## 2025-03-20 DIAGNOSIS — R00.1 BRADYCARDIA: ICD-10-CM

## 2025-03-20 LAB
ANION GAP SERPL CALCULATED.3IONS-SCNC: 11 MMOL/L (ref 4–13)
ATRIAL RATE: 312 BPM
BUN SERPL-MCNC: 20 MG/DL (ref 5–25)
CALCIUM SERPL-MCNC: 10.1 MG/DL (ref 8.4–10.2)
CHLORIDE SERPL-SCNC: 100 MMOL/L (ref 96–108)
CO2 SERPL-SCNC: 28 MMOL/L (ref 21–32)
CREAT SERPL-MCNC: 1.16 MG/DL (ref 0.6–1.3)
ERYTHROCYTE [DISTWIDTH] IN BLOOD BY AUTOMATED COUNT: 12.2 % (ref 11.6–15.1)
GFR SERPL CREATININE-BSD FRML MDRD: 63 ML/MIN/1.73SQ M
GLUCOSE P FAST SERPL-MCNC: 120 MG/DL (ref 65–99)
GLUCOSE SERPL-MCNC: 120 MG/DL (ref 65–140)
HCT VFR BLD AUTO: 50.3 % (ref 36.5–49.3)
HGB BLD-MCNC: 16.6 G/DL (ref 12–17)
INR PPP: 1.42 (ref 0.85–1.19)
MCH RBC QN AUTO: 30.6 PG (ref 26.8–34.3)
MCHC RBC AUTO-ENTMCNC: 33 G/DL (ref 31.4–37.4)
MCV RBC AUTO: 93 FL (ref 82–98)
PLATELET # BLD AUTO: 280 THOUSANDS/UL (ref 149–390)
PMV BLD AUTO: 11.2 FL (ref 8.9–12.7)
POTASSIUM SERPL-SCNC: 3.6 MMOL/L (ref 3.5–5.3)
PROTHROMBIN TIME: 17.6 SECONDS (ref 12.3–15)
QRS AXIS: -31 DEGREES
QRS AXIS: -32 DEGREES
QRSD INTERVAL: 112 MS
QRSD INTERVAL: 136 MS
QT INTERVAL: 476 MS
QT INTERVAL: 496 MS
QTC INTERVAL: 434 MS
QTC INTERVAL: 480 MS
RBC # BLD AUTO: 5.43 MILLION/UL (ref 3.88–5.62)
SODIUM SERPL-SCNC: 139 MMOL/L (ref 135–147)
T WAVE AXIS: 142 DEGREES
T WAVE AXIS: 67 DEGREES
VENTRICULAR RATE: 46 BPM
VENTRICULAR RATE: 61 BPM
WBC # BLD AUTO: 10.01 THOUSAND/UL (ref 4.31–10.16)

## 2025-03-20 PROCEDURE — C1769 GUIDE WIRE: HCPCS | Performed by: INTERNAL MEDICINE

## 2025-03-20 PROCEDURE — 71045 X-RAY EXAM CHEST 1 VIEW: CPT

## 2025-03-20 PROCEDURE — 33207 INSERT HEART PM VENTRICULAR: CPT | Performed by: INTERNAL MEDICINE

## 2025-03-20 PROCEDURE — NC001 PR NO CHARGE: Performed by: PHYSICIAN ASSISTANT

## 2025-03-20 PROCEDURE — C1887 CATHETER, GUIDING: HCPCS | Performed by: INTERNAL MEDICINE

## 2025-03-20 PROCEDURE — C1898 LEAD, PMKR, OTHER THAN TRANS: HCPCS | Performed by: INTERNAL MEDICINE

## 2025-03-20 PROCEDURE — 85027 COMPLETE CBC AUTOMATED: CPT | Performed by: PHYSICIAN ASSISTANT

## 2025-03-20 PROCEDURE — 85610 PROTHROMBIN TIME: CPT | Performed by: PHYSICIAN ASSISTANT

## 2025-03-20 PROCEDURE — 93005 ELECTROCARDIOGRAM TRACING: CPT

## 2025-03-20 PROCEDURE — C1786 PMKR, SINGLE, RATE-RESP: HCPCS | Performed by: INTERNAL MEDICINE

## 2025-03-20 PROCEDURE — C1892 INTRO/SHEATH,FIXED,PEEL-AWAY: HCPCS | Performed by: INTERNAL MEDICINE

## 2025-03-20 PROCEDURE — 80048 BASIC METABOLIC PNL TOTAL CA: CPT | Performed by: PHYSICIAN ASSISTANT

## 2025-03-20 PROCEDURE — 93010 ELECTROCARDIOGRAM REPORT: CPT | Performed by: INTERNAL MEDICINE

## 2025-03-20 DEVICE — ENVELOPE CMRM6122 ABSORB MED MR
Type: IMPLANTABLE DEVICE | Site: CHEST  WALL | Status: FUNCTIONAL
Brand: TYRX™

## 2025-03-20 DEVICE — IPG W1SR01 AZURE XT SR MRI USA
Type: IMPLANTABLE DEVICE | Site: CHEST  WALL | Status: FUNCTIONAL
Brand: AZURE™ XT SR MRI SURESCAN™

## 2025-03-20 RX ORDER — SODIUM CHLORIDE 9 MG/ML
INJECTION, SOLUTION INTRAVENOUS CONTINUOUS PRN
Status: DISCONTINUED | OUTPATIENT
Start: 2025-03-20 | End: 2025-03-20

## 2025-03-20 RX ORDER — GENTAMICIN 40 MG/ML
INJECTION, SOLUTION INTRAMUSCULAR; INTRAVENOUS CODE/TRAUMA/SEDATION MEDICATION
Status: DISCONTINUED | OUTPATIENT
Start: 2025-03-20 | End: 2025-03-20 | Stop reason: HOSPADM

## 2025-03-20 RX ORDER — LIDOCAINE HYDROCHLORIDE 10 MG/ML
INJECTION, SOLUTION EPIDURAL; INFILTRATION; INTRACAUDAL; PERINEURAL CODE/TRAUMA/SEDATION MEDICATION
Status: DISCONTINUED | OUTPATIENT
Start: 2025-03-20 | End: 2025-03-20 | Stop reason: HOSPADM

## 2025-03-20 RX ORDER — ACETAMINOPHEN 325 MG/1
650 TABLET ORAL EVERY 4 HOURS PRN
Status: DISCONTINUED | OUTPATIENT
Start: 2025-03-20 | End: 2025-03-20 | Stop reason: HOSPADM

## 2025-03-20 RX ORDER — SODIUM CHLORIDE 9 MG/ML
20 INJECTION, SOLUTION INTRAVENOUS CONTINUOUS
Status: DISCONTINUED | OUTPATIENT
Start: 2025-03-20 | End: 2025-03-20 | Stop reason: HOSPADM

## 2025-03-20 RX ORDER — PROPOFOL 10 MG/ML
INJECTION, EMULSION INTRAVENOUS CONTINUOUS PRN
Status: DISCONTINUED | OUTPATIENT
Start: 2025-03-20 | End: 2025-03-20

## 2025-03-20 RX ORDER — CEFAZOLIN SODIUM 2 G/50ML
2000 SOLUTION INTRAVENOUS ONCE
Status: COMPLETED | OUTPATIENT
Start: 2025-03-20 | End: 2025-03-20

## 2025-03-20 RX ORDER — FENTANYL CITRATE 50 UG/ML
INJECTION, SOLUTION INTRAMUSCULAR; INTRAVENOUS AS NEEDED
Status: DISCONTINUED | OUTPATIENT
Start: 2025-03-20 | End: 2025-03-20

## 2025-03-20 RX ADMIN — SODIUM CHLORIDE: 9 INJECTION, SOLUTION INTRAVENOUS at 08:07

## 2025-03-20 RX ADMIN — FENTANYL CITRATE 25 MCG: 50 INJECTION INTRAMUSCULAR; INTRAVENOUS at 08:38

## 2025-03-20 RX ADMIN — NOREPINEPHRINE BITARTRATE 16 MCG: 1 INJECTION, SOLUTION, CONCENTRATE INTRAVENOUS at 08:57

## 2025-03-20 RX ADMIN — NOREPINEPHRINE BITARTRATE 16 MCG: 1 INJECTION, SOLUTION, CONCENTRATE INTRAVENOUS at 08:41

## 2025-03-20 RX ADMIN — PROPOFOL 80 MCG/KG/MIN: 10 INJECTION, EMULSION INTRAVENOUS at 08:13

## 2025-03-20 RX ADMIN — NOREPINEPHRINE BITARTRATE 8 MCG: 1 INJECTION, SOLUTION, CONCENTRATE INTRAVENOUS at 09:03

## 2025-03-20 RX ADMIN — ACETAMINOPHEN 650 MG: 325 TABLET, FILM COATED ORAL at 09:57

## 2025-03-20 RX ADMIN — NOREPINEPHRINE BITARTRATE 16 MCG: 1 INJECTION, SOLUTION, CONCENTRATE INTRAVENOUS at 08:48

## 2025-03-20 RX ADMIN — CEFAZOLIN SODIUM 2000 MG: 2 SOLUTION INTRAVENOUS at 08:18

## 2025-03-20 RX ADMIN — FENTANYL CITRATE 25 MCG: 50 INJECTION INTRAMUSCULAR; INTRAVENOUS at 08:13

## 2025-03-20 NOTE — Clinical Note
The PACER GENERATOR INA XT SR MRI SURESCAN - [id number] device was inserted. The leads were placed into the connector and visually verified to be in correct position. Injury current obtained.

## 2025-03-20 NOTE — ASSESSMENT & PLAN NOTE
Permanent atrial fibrillation with slow ventricular response, not on AV araseli agents or antiarrhythmic  3/2023 - atrial flutter initially incidentally noted at time of colonoscopy  8/2023 - underwent typical CTI flutter line  10/2023 - seen in EP follow up, afib with slow ventricular response noted however patient asymptomatic --- pacemaker discussed however declined   Ongoing slow atrial fibrillation noted remain, however patient remained asymptomatic until more recently  Maintained on Xarelto anticoagulation      Normal LV systolic function with LVEF 55 to 60% per echo 6/2023

## 2025-03-20 NOTE — ASSESSMENT & PLAN NOTE
Atrial flutter with slow ventricular response, status post typical flutter line 8/2023  3/2023 - incidentally noted at time of colonoscopy    5/2023 - Holter monitor showed an average heart rate of 41 bpm while in flutter   10/2023 - seen in follow up, noted to be in asymptomatic afib with slow ventricular response --- as patient was initially asymptomatic device was not pursued  Maintained on Xarelto anticoagulation (CHADS2 Vasc = 2), unable to tolerate AV araseli agents due to baseline bradycardia

## 2025-03-20 NOTE — ANESTHESIA PREPROCEDURE EVALUATION
Procedure:  Cardiac pacer implant Single chamber pacer (Chest)  PMH   HTN, HLD, bradycardia, a-fib/flutter s/p ablation, gerd, hypothyroid     EKG 1/2025:  Atrial fibrillation with probable heart block and junctional escape rhythm at 47 bpm     Stress EKG 11/2023:  Technically non-diagnostic stress ECG due to failure to reach target HR -- patient only reached 100 bpm (66% MPHR). No ischemic changes seen at level achieved.  Borderline hypertensive response with peak /100.   Blunted heart rate response with patient terminating exercise due to dyspnea after reaching a heart rate of only 100 bpm at peak exercise. Rhythm was atrial fibrillation throughout.     Echo 2023:       Left Ventricle: Left ventricular cavity size is normal. Wall thickness is moderately increased. There is moderate concentric hypertrophy. The left ventricular ejection fraction is 55-60%. Systolic function is normal. Wall motion is normal. Diastolic function is abnormal.    Right Ventricle: Right ventricular cavity size is dilated. Systolic function is normal.    Left Atrium: The atrium is severely dilated.    Right Atrium: The atrium is dilated.    Atrial Septum: The septum bows into the right atrium, suggesting increased left atrial pressure.    Aortic Valve: There is mild regurgitation.    Mitral Valve: There is moderate regurgitation with an eccentrically directed jet.    Tricuspid Valve: There is mild regurgitation. The right ventricular systolic pressure is mildly elevated. The estimated right ventricular systolic pressure is 42.00 mmHg.    Aorta: The aortic root is normal in size. The ascending aorta is moderately dilated. The aortic root is 3.50 cm. The ascending aorta is 4.4 cm.  Relevant Problems   CARDIO   (+) Atrial fibrillation (HCC)   (+) Atrial flutter (HCC)   (+) Chronic deep vein thrombosis (DVT) of axillary vein, unspecified laterality (HCC)   (+) Chronic deep vein thrombosis (DVT) of non-extremity vein   (+) Hyperlipidemia  "  (+) Hypertension      ENDO   (+) Hypothyroidism      GI/HEPATIC   (+) GERD (gastroesophageal reflux disease)      Meds:  Albuterol not needed regular  Amlodipine today  HCTZ not this AM  Xarelto not taken today, last taken yesterda  Omeprazole stopped \"a wihle ago\"    METS  Walks miles daily    NPO:  Sip with meds this AM  Food 6pm      Physical Exam    Airway    Mallampati score: II         Dental   Comment: Cavities, nothing loose  , No notable dental hx     Cardiovascular  Rhythm: irregular, Rate: abnormal    Pulmonary      Other Findings        Anesthesia Plan  ASA Score- 3     Anesthesia Type- IV sedation with anesthesia with ASA Monitors.         Additional Monitors:     Airway Plan:     Comment: GA Backup.       Plan Factors-Exercise tolerance (METS): >4 METS.    Chart reviewed. EKG reviewed.  Existing labs reviewed. Patient summary reviewed.                  Induction- intravenous.    Postoperative Plan-     Perioperative Resuscitation Plan - Level 1 - Full Code.       Informed Consent- Anesthetic plan and risks discussed with patient, son and daughter.  I personally reviewed this patient with the CRNA. Discussed and agreed on the Anesthesia Plan with the CRNA..      NPO Status:  No vitals data found for the desired time range.        "

## 2025-03-20 NOTE — H&P
H&P Exam - Cardiology   Yves Valencia Jr. 69 y.o. male MRN: [id number]  Unit/Bed#: [facility] RO[facility] Encounter: [id number]    Assessment & Plan     Assessment:  Assessment & Plan  Atrial fibrillation (HCC)  Permanent atrial fibrillation with slow ventricular response, not on AV araseli agents or antiarrhythmic  3/2023 - atrial flutter initially incidentally noted at time of colonoscopy  8/2023 - underwent typical CTI flutter line  10/2023 - seen in EP follow up, afib with slow ventricular response noted however patient asymptomatic --- pacemaker discussed however declined   Ongoing slow atrial fibrillation noted remain, however patient remained asymptomatic until more recently  Maintained on Xarelto anticoagulation      Normal LV systolic function with LVEF 55 to 60% per echo 6/2023  Bradycardia  Symptomatic bradycardia/permanent atrial fibrillation with slow ventricular response, not on AV araseli agents or antiarrhythmics  Longstanding history, pacemaker previously recommended however patient declined as he was largely asymptomatic  8/2023 - transient complete heart block noted in the immediate post ablation setting, improved to Mobitz type I, patient remained asymptomatic  Pacemaker recommended however patient declined  More recently has had episodes of dizziness/lightheadedness, thus pacemaker finally being pursued  Atrial flutter (HCC)  Atrial flutter with slow ventricular response, status post typical flutter line 8/2023  3/2023 - incidentally noted at time of colonoscopy    5/2023 - Holter monitor showed an average heart rate of 41 bpm while in flutter   10/2023 - seen in follow up, noted to be in asymptomatic afib with slow ventricular response --- as patient was initially asymptomatic device was not pursued  Maintained on Xarelto anticoagulation (CHADS2 Vasc = 2), unable to tolerate AV araseli agents due to baseline bradycardia  Status post ablation of atrial flutter (CTI line)  8/29/2023    Hyperlipidemia  Hyperlipidemia, maintained on statin therapy  Hypertension  Hypertension, maintained on Norvasc and hydrochlorothiazide        Plan:  Single-chamber pacemaker implantation.    He will require several hours of bedrest along with portable chest x-ray in the postoperative setting.  Anticipate discharge home later today if no issues noted.  All discharge instructions and restrictions were reviewed with the patient and his family, no medication changes expected.      History of Present Illness   HPI:  Yves Valencia Jr. is a 69 y.o. year old male with permanent atrial fibrillation with slow ventricular response, atrial flutter status post prior typical flutter line, normal LV systolic function with LVEF 55-60%, known bradycardia with transient complete heart block and Mobitz 1 in the post ablation setting, hypertension, hyperlipidemia, and hypothyroidism.  He typically follows with Dr. Narayan as an outpatient.  He was incidentally found to have atrial flutter at the time of colonoscopy, and was started on Xarelto anticoagulation.  He is not able to tolerate AV araseli agents given baseline bradycardia.  He was seen in EP consultation, and ultimately an atrial flutter ablation was recommended.  He underwent successful typical flutter line 8/2023.  In the immediate post ablation setting he was found to have transient complete heart block.  Fortunately he was asymptomatic with otherwise stable vital signs, this ongoing monitoring was recommended.  His rhythms improved to Mobitz type I, thus he was discharged with a live event monitor.  His first monitor showed normal sinus rhythm again with transient heart block during sleeping hours with which he was asymptomatic.  His second monitor showed atrial fibrillation with slow ventricular response, nonsustained VT, however no significant pauses.  He offered no significant complaints while wearing these monitors.  A pacemaker was recommended, however the  patient declined.  He was seen in follow-up 10/2023, at which time he was found to be in atrial fibrillation with slow ventricular response.  Pacemaker was again discussed however patient continued to feel well and this declined.  He was not on AV araseil agents or antiarrhythmics, thus permanent atrial fibrillation was accepted.  He has remained bradycardic over the past several years, however more recently has reported several issues of dizziness, lightheadedness, and presyncope.  He denies alden syncope.  Given these worsening symptoms, he is finally in agreement to proceed with single-chamber pacemaker implantation.  He presents today to undergo that procedure.      Review of Systems  ROS as noted above, otherwise 12 point review of systems was performed and is negative.       Historical Information   Past Medical History:   Diagnosis Date    Colon polyp     GERD (gastroesophageal reflux disease)     Hyperlipidemia     Hypertension     Seasonal allergies     Thyroid disease      Past Surgical History:   Procedure Laterality Date    ACHILLES TENDON REPAIR Left     CARDIAC ELECTROPHYSIOLOGY PROCEDURE N/A 08/29/2023    Procedure: Cardiac eps/aflutter ablation;  Surgeon: Jony Brunson DO;  Location: [facility];  Service: Cardiology    COLONOSCOPY  09/19/2024    COLONOSCOPY  01/29/2018     Family History:   Family History   Problem Relation Age of Onset    Colon cancer Father     Diabetes Brother        Social History   Social History     Substance and Sexual Activity   Alcohol Use Yes    Comment: occasional     Social History     Substance and Sexual Activity   Drug Use Never     Social History     Tobacco Use   Smoking Status Never   Smokeless Tobacco Never         Meds/Allergies   all medications and allergies reviewed  Home Medications:   Medications Prior to Admission:     amLODIPine (NORVASC) 10 mg tablet    fexofenadine (ALLEGRA) 60 MG tablet    finasteride (PROSCAR) 5 mg tablet    hydrochlorothiazide  "(HYDRODIURIL) 25 mg tablet    ibuprofen (MOTRIN) 200 mg tablet    levothyroxine 150 mcg tablet    Multiple Vitamins-Minerals (Mens 50+ Multivitamin) TABS    rivaroxaban (Xarelto) 20 mg tablet    simvastatin (ZOCOR) 5 MG tablet    albuterol (PROVENTIL HFA,VENTOLIN HFA) 90 mcg/act inhaler    amoxicillin (AMOXIL) 500 mg capsule    cholestyramine (QUESTRAN) 4 g packet    Loperamide HCl (IMODIUM PO)    omeprazole (PriLOSEC) 20 mg delayed release capsule    oxymetazoline (AFRIN) 0.05 % nasal spray    QUEtiapine (SEROquel) 50 mg tablet    No Known Allergies    Objective   Vitals: Blood pressure (!) 200/93, pulse (!) 46, temperature (!) 97.3 °F (36.3 °C), temperature source Temporal, resp. rate 20, height 5' 10\" (1.778 m), weight 94.5 kg (208 lb 5.4 oz), SpO2 98%.  Orthostatic Blood Pressures      Flowsheet Row Most Recent Value   Blood Pressure 200/93 filed at 03/20/2025 0701            No intake or output data in the 24 hours ending 03/20/25 0807    Invasive Devices       Peripheral Intravenous Line  Duration             Peripheral IV 03/20/25 Left Forearm <1 day                    Physical Exam  GEN: NAD, alert and oriented x 3, well appearing  SKIN: dry without significant lesions or rashes  HEENT: NCAT, PERRL, EOMs intact  NECK: No JVD appreciated  CARDIOVASCULAR: Irregular  LUNGS: No respiratory distress, good overall effort, no audible rhonchi or wheezing noted  ABDOMEN: Soft, nontender, nondistended  EXTREMITIES/VASCULAR: perfused without clubbing, cyanosis, or LE edema b/l  PSYCH: Normal mood and affect  NEURO: CN ll-Xll grossly intact      Lab Results: I have personally reviewed pertinent lab results.    Results from last 7 days   Lab Units 03/20/25  0652   WBC Thousand/uL 10.01   HEMOGLOBIN g/dL 16.6   HEMATOCRIT % 50.3*   PLATELETS Thousands/uL 280     Results from last 7 days   Lab Units 03/20/25  0652   POTASSIUM mmol/L 3.6   CHLORIDE mmol/L 100   CO2 mmol/L 28   BUN mg/dL 20   CREATININE mg/dL 1.16   CALCIUM " mg/dL 10.1     Results from last 7 days   Lab Units 03/20/25  0652   INR  1.42*             Imaging: Results Review Statement: No pertinent imaging studies reviewed.    ECHO: No results found for this or any previous visit.      Results for orders placed during the hospital encounter of 06/05/23    Echo complete w/ contrast if indicated    Interpretation Summary    Left Ventricle: Left ventricular cavity size is normal. Wall thickness is moderately increased. There is moderate concentric hypertrophy. The left ventricular ejection fraction is 55-60%. Systolic function is normal. Wall motion is normal. Diastolic function is abnormal.    Right Ventricle: Right ventricular cavity size is dilated. Systolic function is normal.    Left Atrium: The atrium is severely dilated.    Right Atrium: The atrium is dilated.    Atrial Septum: The septum bows into the right atrium, suggesting increased left atrial pressure.    Aortic Valve: There is mild regurgitation.    Mitral Valve: There is moderate regurgitation with an eccentrically directed jet.    Tricuspid Valve: There is mild regurgitation. The right ventricular systolic pressure is mildly elevated. The estimated right ventricular systolic pressure is 42.00 mmHg.    Aorta: The aortic root is normal in size. The ascending aorta is moderately dilated. The aortic root is 3.50 cm. The ascending aorta is 4.4 cm.        EKG:         Code Status: Level 1 - Full Code

## 2025-03-20 NOTE — ANESTHESIA POSTPROCEDURE EVALUATION
Post-Op Assessment Note    CV Status:  Stable  Pain Score: 0    Pain management: adequate       Mental Status:  Alert and awake   Hydration Status:  Euvolemic   PONV Controlled:  Controlled   Airway Patency:  Patent     Post Op Vitals Reviewed: Yes    No anethesia notable event occurred.    Staff: CRNA           Last Filed PACU Vitals:  Vitals Value Taken Time   Temp     Pulse 60    /79    Resp     SpO2 100% RA

## 2025-03-20 NOTE — ASSESSMENT & PLAN NOTE
Symptomatic bradycardia/permanent atrial fibrillation with slow ventricular response, not on AV araseli agents or antiarrhythmics  Longstanding history, pacemaker previously recommended however patient declined as he was largely asymptomatic  8/2023 - transient complete heart block noted in the immediate post ablation setting, improved to Mobitz type I, patient remained asymptomatic  Pacemaker recommended however patient declined  More recently has had episodes of dizziness/lightheadedness, thus pacemaker finally being pursued

## 2025-03-20 NOTE — Clinical Note
The ECG shows an atrial flutter, an atrial fibrillation and a sinus bradycardia. ECG rate  = 43 bpm.

## 2025-03-20 NOTE — DISCHARGE INSTR - AVS FIRST PAGE
Post Procedure Care instructions:     Pain management   Tylenol (acetaminophen) and ice      If you go home on the day of the procedure:  Please send device transmission the following morning      Wound care  For 7 days:  Bandage must remain on wound   Bathing:    If bandage has a good seal on all sides, you may shower   If the bandage is not sealed or there is any question/concern, do not shower. Sponge bathe only     One week after procedure:   Remove bandage    Do not use lotions/powders/creams on incision   Shower normally, do not scrub wound      Restrictions for 6 weeks: (apply these rules only to the arm closest to the incision)   Do not raise elbow above shoulder height   Do not lift greater than 10lbs    No pulling on grab bar or bed rail    When to call clinic:    Redness or swelling at incision site   Opening and/or drainage from the incision   Temperature >100.4 F     If you have any questions:   [phone] 8:30am-5:30pm  [phone] After hours  [phone] Appointments     Information about your cardiac device:   Pacemaker     WHAT YOU SHOULD KNOW:      A pacemaker is a small, battery-powered device that is placed under your skin in your upper chest area with wires placed through a vein that lead directly into the heart. It helps regulate your heart rate and prevent your heart from beating too slowly.      Frequently asked questions about cardiac devices     How long does the device last?    Approximately 10 years, it depends on how often your body uses it     How is my device monitored?    You will have a 2-week follow-up with our device clinic after the initial placement.  At this visit our device techs will establish your follow up appointments   Transmissions are sent every 3 months    Alerts are sent if there are changes to your heart rhythm or the device in between transmissions     Are there long-term restrictions or are there activities that will affect my device?    Generally once healing  is completed there are minimal restrictions long-term   When going through security checkpoints, let them know you have an implanted cardiac device.  You will receive a permeant card, keep this with you   If you have any questions about electronic equipment and your device, please call the device  hotline

## 2025-03-20 NOTE — Clinical Note
[facility] eMERGENCY dEPARTMENT eNCOUnter   [address] Name: Mike Lares  MRN: [id number]  [place] 1982  Date of evaluation: 9/4/21     Mike Lares is a 43 y.o. male with CC: Dental Pain (2 broken teeth; can't get in to dentist until next Thursday)      Based on the medical record the care appears appropriate. I was personally available for consultation in the Emergency Department.     Yandel Vasquez MD  Attending Emergency Physician                    Sunitha Thomas MD  09/04/21 1584

## 2025-03-23 NOTE — ANESTHESIA POSTPROCEDURE EVALUATION
Post-Op Assessment Note    CV Status:  Stable    Pain management: adequate       Mental Status:  Alert and awake   Hydration Status:  Euvolemic   PONV Controlled:  Controlled   Airway Patency:  Patent     Post Op Vitals Reviewed: Yes    No anethesia notable event occurred.    Staff: Anesthesiologist, CRNA           Last Filed PACU Vitals:  Vitals Value Taken Time   Temp 97.4 °F (36.3 °C) 03/20/25 0945   Pulse 60 03/20/25 1230   /76 03/20/25 1230   Resp 16 03/20/25 0945   SpO2 96 % 03/20/25 1230

## 2025-03-31 ENCOUNTER — TELEPHONE (OUTPATIENT)
Dept: CARDIOLOGY CLINIC | Facility: CLINIC | Age: 70
End: 2025-03-31

## 2025-03-31 NOTE — TELEPHONE ENCOUNTER
Received a call from patient stating that he started having bruising on his chest and left arm post his pacer implant on 3/20/25. Pt re started Xarelto 20 mg the day after his procedure per doctor instructions, bruising started on 3/29/25 per patient, he denies any swelling on pain. Patient wants to know if that bruising is normal post procedure? Or if any further action is required to take?

## 2025-04-02 ENCOUNTER — RESULTS FOLLOW-UP (OUTPATIENT)
Dept: CARDIOLOGY CLINIC | Facility: CLINIC | Age: 70
End: 2025-04-02

## 2025-04-02 ENCOUNTER — IN-CLINIC DEVICE VISIT (OUTPATIENT)
Dept: CARDIOLOGY CLINIC | Facility: CLINIC | Age: 70
End: 2025-04-02

## 2025-04-02 DIAGNOSIS — Z95.0 CARDIAC PACEMAKER IN SITU: Primary | ICD-10-CM

## 2025-04-02 PROCEDURE — 99024 POSTOP FOLLOW-UP VISIT: CPT | Performed by: INTERNAL MEDICINE

## 2025-04-02 NOTE — PROGRESS NOTES
Results for orders placed or performed in visit on 04/02/25   Cardiac EP device report    Narrative    MDT SC/PM-ACTIVE SYSTEM IS MRI CONDITIONAL  DEVICE INTERROGATED IN THE [place] OFFICE: PRESENTING EGRAM @ 62 BPM. BATTERY VOLTAGE ADEQUATE-13 YRS.  98%. ALL LEAD PARAMETERS WITHIN NORMAL LIMITS. RV THRS TREND W/SLIGHT INCREASE. NO SIGNIFICANT HIGH RATE EPISODES. NO PROGRAMMING CHANGES MADE TO DEVICE PARAMETERS. NORMAL DEVICE FUNCTION. NC

## 2025-04-14 ENCOUNTER — RESULTS FOLLOW-UP (OUTPATIENT)
Dept: NON INVASIVE DIAGNOSTICS | Facility: HOSPITAL | Age: 70
End: 2025-04-14

## 2025-04-19 DIAGNOSIS — E78.00 PURE HYPERCHOLESTEROLEMIA: ICD-10-CM

## 2025-04-21 RX ORDER — SIMVASTATIN 5 MG
5 TABLET ORAL
Qty: 90 TABLET | Refills: 3 | Status: SHIPPED | OUTPATIENT
Start: 2025-04-21

## 2025-04-30 ENCOUNTER — OFFICE VISIT (OUTPATIENT)
Dept: GASTROENTEROLOGY | Facility: CLINIC | Age: 70
End: 2025-04-30
Payer: MEDICARE

## 2025-04-30 VITALS
DIASTOLIC BLOOD PRESSURE: 88 MMHG | HEIGHT: 71 IN | BODY MASS INDEX: 29.54 KG/M2 | SYSTOLIC BLOOD PRESSURE: 143 MMHG | WEIGHT: 211 LBS

## 2025-04-30 DIAGNOSIS — K52.832 LYMPHOCYTIC COLITIS: ICD-10-CM

## 2025-04-30 DIAGNOSIS — K52.9 CHRONIC DIARRHEA: Primary | ICD-10-CM

## 2025-04-30 DIAGNOSIS — R12 HEARTBURN: ICD-10-CM

## 2025-04-30 PROCEDURE — 99214 OFFICE O/P EST MOD 30 MIN: CPT | Performed by: STUDENT IN AN ORGANIZED HEALTH CARE EDUCATION/TRAINING PROGRAM

## 2025-04-30 RX ORDER — FAMOTIDINE 40 MG/1
40 TABLET, FILM COATED ORAL
Qty: 60 TABLET | Refills: 0 | Status: SHIPPED | OUTPATIENT
Start: 2025-04-30

## 2025-04-30 NOTE — PROGRESS NOTES
Name: Yves Valencia Jr.      : 1955      MRN: [id number]  Encounter Provider: Rey Stein DO  Encounter Date: 2025   Encounter department: [facility]  :  Assessment & Plan  Chronic diarrhea  -Previously attributed to lymphocytic colitis, however he has not had any response to budesonide, Pepto or cholestyramine.  At this point to look for other considerations, including IBS, SIBO, malabsorption syndromes.  - Increased fiber intake has shown improvement in symptoms over the past 2 to 3 weeks.  - Liver function tests from 2025 were normal, nothing to suggest biliary abnormality.  - Order noninvasive stool testing for H. pylori, fat malabsorption, pancreatic function, and Giardia.  - Conduct a breath test to rule out SIBO.  Would prescribe rifaximin if positive.  - Occasional use of Imodium is acceptable if symptoms continue to improve with fiber intake.  Orders:    Small intestinal bacterial overgrowth    Giardia antigen; Future    Pancreatic elastase, fecal; Future    Fecal fat, qualitative; Future    H. pylori antigen, stool; Future    Lymphocytic colitis  As above, he has not responded to budesonide, cholestyramine or Pepcid.  He also discontinued omeprazole which can be a cause.  Will workup for other potential sources as above.  I am pleased to note that he is having improvement with fiber supplementation.       Heartburn  He stopped omeprazole and has intermittent heartburn at nighttime.  We will give Pepcid to take as needed.  We will reassess at his next visit.  Orders:    famotidine (PEPCID) 40 MG tablet; Take 1 tablet (40 mg total) by mouth daily at bedtime as needed for heartburn for up to 60 doses      Assessment & Plan      History of Present Illness   History of Present Illness  The patient is a 69-year-old male who presents for follow-up of lymphocytic colitis.    Budesonide and pepto did not work. Cholestyramine was ineffective in managing  symptoms, even after a month-long trial in 03/2025. The medication has since been discontinued. He had also stopped omeprazole. An increase in fiber intake over the past 2 to 3 weeks has led to a significant improvement in condition. Bowel movements have become less frequent, occurring once or twice daily, with a reduction in volume and an increase in solidity, although they are not fully formed. Excessive gas production is reported. Pepto-Bismol causes stools to darken, and flatulence often results in soft, light brown stools.    Omeprazole was discontinued some time ago, leading to occasional reflux symptoms managed with Tums before bed. No prior EGD noted.    Simvastatin is taken for cholesterol management and finasteride is also used. A pacemaker was placed due to a pulse rate of 40. Thyroid function is reported to be normal.    PAST SURGICAL HISTORY:  Pacemaker placement 03/20/2025    SOCIAL HISTORY  Exercise: Working out  Diet: Increased fiber intake          PAST SURGICAL HISTORY:  Pacemaker placement.    SOCIAL HISTORY  Exercise: Working out  Diet: Increased fiber intake      Review of Systems A complete review of systems is negative other than that noted above in the HPI.      Current Outpatient Medications   Medication Sig Dispense Refill    albuterol (PROVENTIL HFA,VENTOLIN HFA) 90 mcg/act inhaler Inhale 2 puffs every 6 (six) hours as needed for wheezing      amLODIPine (NORVASC) 10 mg tablet Take 1 tablet (10 mg total) by mouth daily 90 tablet 3    amoxicillin (AMOXIL) 500 mg capsule Take 2,000 mg by mouth if needed Prior to dental work      fexofenadine (ALLEGRA) 60 MG tablet if needed      finasteride (PROSCAR) 5 mg tablet Take 5 mg by mouth daily      hydrochlorothiazide (HYDRODIURIL) 25 mg tablet Take 1 tablet (25 mg total) by mouth daily 90 tablet 3    ibuprofen (MOTRIN) 200 mg tablet       levothyroxine 150 mcg tablet TAKE 1 TABLET BY MOUTH EVERY DAY IN THE MORNING ON EMPTY STOMACH      Loperamide  "HCl (IMODIUM PO) Take by mouth      Multiple Vitamins-Minerals (Mens 50+ Multivitamin) TABS Take by mouth      oxymetazoline (AFRIN) 0.05 % nasal spray 2 sprays by Each Nare route 2 (two) times a day      QUEtiapine (SEROquel) 50 mg tablet TAKE 1 TABLET BY MOUTH EVERY DAY AT BEDTIME FOR 30 DAYS      rivaroxaban (Xarelto) 20 mg tablet Take 1 tablet (20 mg total) by mouth daily with breakfast 30 tablet 11    simvastatin (ZOCOR) 5 MG tablet TAKE 1 TABLET BY MOUTH AT BEDTIME 90 tablet 3    cholestyramine (QUESTRAN) 4 g packet TAKE 1 PACKET BY MOUTH 2 TIMES A DAY WITH MEALS. (Patient not taking: Reported on 4/30/2025) 180 packet 1    omeprazole (PriLOSEC) 20 mg delayed release capsule Take 20 mg by mouth daily (Patient not taking: Reported on 1/8/2025)       No current facility-administered medications for this visit.     Objective   /88   Ht 5' 10.75\" (1.797 m)   Wt 95.7 kg (211 lb)   BMI 29.64 kg/m²       Physical Exam    General Appearance: NAD, cooperative, alert  Eyes: Anicteric  GI:  Soft, non-tender, non-distended; normal bowel sounds; no masses, no organomegaly   Rectal: Deferred  Musculoskeletal: No edema.  Skin:     No jaundice      Results  Labs   - Liver function tests: 02/2025, Normal    Imaging   - Colonoscopy: Evidence of lymphocytic colitis  Lab Results: I personally reviewed relevant lab results.       Results for orders placed during the hospital encounter of 09/19/24    Colonoscopy    Narrative  Table formatting from the original result was not included.  [facility]  [address] [id number] [phone]      DATE OF SERVICE:  9/19/24    PHYSICIAN(S):  Attending:  Rey Stein DO    Fellow:  No Staff Documented      INDICATION:  Change in bowel habits, History of colon polyps      POST-OP DIAGNOSIS:  See the impression below.    HISTORY:  Prior colonoscopy: 5 years ago.    BOWEL " PREPARATION:  Miralax/Dulcolax      PREPROCEDURE:  Informed consent was obtained for the procedure, including sedation. Risks including but not limited to bleeding, infection, perforation, adverse drug reaction and aspiration were explained in detail. Also explained about less than 100% sensitivity with the exam and other alternatives. The patient was placed in the left lateral decubitus position.    Procedure: Colonoscopy    DETAILS OF PROCEDURE:  Patient was taken to the procedure room where a time out was performed to confirm correct patient and correct procedure. The patient underwent monitored anesthesia care, which was administered by an anesthesia professional. The patient's blood pressure, heart rate, oxygen and respirations were monitored throughout the procedure. A digital rectal exam was performed. The scope was introduced through the anus and advanced to the cecum. Retroflexion was performed in the rectum. The quality of bowel preparation was evaluated using the Taberg Bowel Preparation Scale with scores of: right colon = 2, transverse colon = 2, left colon = 2. The total BBPS score was 6. Bowel prep was adequate. The patient's estimated blood loss was minimal (<5 mL). The procedure was not difficult. The patient tolerated the procedure well. There were no apparent adverse events.      ANESTHESIA INFORMATION:  ASA: III  Anesthesia Type: IV Sedation with Anesthesia    MEDICATIONS:  No administrations occurring from 1333 to 1358 on 09/19/24        FINDINGS:  Performed multiple forceps biopsies in the ascending colon and descending colon to rule out colitis  Multiple medium, scattered diverticula of mild severity with associated peridiverticular inflammation containing no content in the descending colon, sigmoid colon and rectosigmoid; no bleeding was identified  Internal small, protruding hemorrhoids observed during retroflexion; no bleeding was identified      EVENTS:  Procedure Events  Event Event  Time  ENDO CECUM REACHED 9/19/2024  1:49 PM  ENDO SCOPE OUT TIME 9/19/2024  1:56 PM      SPECIMENS:  ID Type Source Tests Collected by Time Destination  1 : random biopsies throughout to r/o microscopic colitis Tissue Colon TISSUE EXAM Rey Stein DO 9/19/2024  1:49 PM      EQUIPMENT:  Colonoscope -[id number]          Impression  Performed forceps biopsies in the ascending colon and descending colon to rule out colitis  Scattered diverticulitis of mild severity in the descending colon, sigmoid colon and rectosigmoid  Small, protruding hemorrhoids        RECOMMENDATION:  Repeat colonoscopy in 5 years, due: 9/18/2029  Family history of colon cancer    Await pathology results  Follow up with GI Clinic  You may resume your anticoagulation medication tomorrow.    We will contact you with the biopsy results in 1-2 weeks via [employer].    Please continue to follow up with your GI provider for any ongoing symptoms.            Rey Stein DO

## 2025-06-22 DIAGNOSIS — R12 HEARTBURN: ICD-10-CM

## 2025-06-23 RX ORDER — FAMOTIDINE 40 MG/1
40 TABLET, FILM COATED ORAL
Qty: 90 TABLET | Refills: 1 | Status: SHIPPED | OUTPATIENT
Start: 2025-06-23

## 2025-07-11 ENCOUNTER — OFFICE VISIT (OUTPATIENT)
Dept: CARDIOLOGY CLINIC | Facility: CLINIC | Age: 70
End: 2025-07-11
Payer: MEDICARE

## 2025-07-11 VITALS
WEIGHT: 205.6 LBS | BODY MASS INDEX: 28.78 KG/M2 | DIASTOLIC BLOOD PRESSURE: 80 MMHG | SYSTOLIC BLOOD PRESSURE: 134 MMHG | HEART RATE: 64 BPM | HEIGHT: 71 IN | OXYGEN SATURATION: 98 %

## 2025-07-11 DIAGNOSIS — I48.19 PERSISTENT ATRIAL FIBRILLATION (HCC): Primary | ICD-10-CM

## 2025-07-11 DIAGNOSIS — Z95.0 CARDIAC PACEMAKER IN SITU: ICD-10-CM

## 2025-07-11 PROCEDURE — G2211 COMPLEX E/M VISIT ADD ON: HCPCS | Performed by: INTERNAL MEDICINE

## 2025-07-11 PROCEDURE — 99214 OFFICE O/P EST MOD 30 MIN: CPT | Performed by: INTERNAL MEDICINE

## 2025-07-11 NOTE — PROGRESS NOTES
[facility] [place]  [address]  [facility] [address]  [facility] [address]  Phone#  [phone]  Fax#  [phone]  [facility] Cardiology Office Follow-up Visit             NAME: Yves Valencia Jr.  AGE: 70 y.o. SEX: male   : 1955   MRN: [id number]    DATE: 2025  TIME: 10:40 AM    Cardiology Problem list:  No specialty comments available.    Assessment & Plan  Persistent atrial fibrillation (HCC)  S/P SC PPM implant History of atrial flutter ablation.   Continue AC with Xarelto. Overall he is asymptomatic and rate controlled.            Cardiac pacemaker in situ  Continue with device checks.               HPI:    Yves Valencia Jr. is a 70 y.o.-year-old male who presents to the cardiology clinic for follow up for the above-listed problems.     Recent PPM implant. No chest pain or dyspnea    Past history, family history, social history, current medications, vital signs, recent lab and imaging studies and  prior cardiology studies reviewed independently on this visit.    Allergies[1]  Current Medications[2]    Review of Systems   Constitutional:  Negative for chills and fever.   HENT:  Negative for ear pain and sore throat.    Eyes:  Negative for pain and visual disturbance.   Respiratory:  Negative for cough and shortness of breath.    Cardiovascular:  Negative for chest pain and palpitations.   Gastrointestinal:  Negative for abdominal pain and vomiting.   Genitourinary:  Negative for dysuria and hematuria.   Musculoskeletal:  Negative for arthralgias and back pain.   Skin:  Negative for color change and rash.   Neurological:  Negative for seizures and syncope.   All other systems reviewed and are negative.      Objective:     Vitals:    25 1017   BP: 134/80   Pulse: 64   SpO2: 98%     Wt Readings from Last 3 Encounters:   25 93.3 kg (205 lb 9.6 oz)   25 95.7 kg (211 lb)   25 94.5 kg (208 lb 5.4 oz)     Pulse Readings from Last 3 Encounters:   25 64  "  03/20/25 60   01/08/25 (!) 47     BP Readings from Last 3 Encounters:   07/11/25 134/80   04/30/25 143/88   03/20/25 140/76     Physical Exam  Vitals and nursing note reviewed.   Constitutional:       General: He is not in acute distress.     Appearance: He is well-developed.   HENT:      Head: Normocephalic and atraumatic.     Eyes:      Conjunctiva/sclera: Conjunctivae normal.       Cardiovascular:      Rate and Rhythm: Normal rate and regular rhythm.      Heart sounds: No murmur heard.  Pulmonary:      Effort: Pulmonary effort is normal. No respiratory distress.      Breath sounds: Normal breath sounds.   Abdominal:      Palpations: Abdomen is soft.      Tenderness: There is no abdominal tenderness.     Musculoskeletal:         General: No swelling.      Cervical back: Neck supple.     Skin:     General: Skin is warm and dry.      Capillary Refill: Capillary refill takes less than 2 seconds.     Neurological:      Mental Status: He is alert.     Psychiatric:         Mood and Affect: Mood normal.         Pertinent Laboratory/Diagnostic Studies:    Laboratory studies reviewed personally by Karl Narayan MD    BMP:   Lab Results   Component Value Date    SODIUM 139 03/20/2025    K 3.6 03/20/2025     03/20/2025    CO2 28 03/20/2025    BUN 20 03/20/2025    CREATININE 1.16 03/20/2025    GLUC 120 03/20/2025    CALCIUM 10.1 03/20/2025     CBC:  Lab Results   Component Value Date    WBC 10.01 03/20/2025    HGB 16.6 03/20/2025    HCT 50.3 (H) 03/20/2025    MCV 93 03/20/2025     03/20/2025     Lipid Profile:   No results found for: \"HDL\"  No results found for: \"LDLCALC\"  No results found for: \"TRIG\"   Other labs:  Lab Results   Component Value Date    TSH 1.810 03/28/2023     Lab Results   Component Value Date    ALT 18 02/24/2025    AST 29 02/24/2025     No results found for: \"HGBA1C\"      Imaging Studies:     Pertinent imaging studies and cardiac studies were independently reviewed on this visit and " "findings summarized.    I have spent a total time of 25  minutes in caring for this patient on the day of the visit/encounter including reviewing and entering of medical history, physical examination, diagnostic results, instructions for management, patient education, risk factor reduction, documenting in the medical record, sending communications to other providers, reviewing/ordering tests, medicine, procedures etc.    Karl Narayan MD, [facility]    Portions of the record may have been created with voice recognition software.  Occasional wrong word or \"sound alike\" substitutions may have occurred due to the inherent limitations of voice recognition software.  Read the chart carefully and recognize, using context, where substitutions have occurred. Please reach out to me directly for any clarifications.         [1] No Known Allergies  [2]   Current Outpatient Medications:     albuterol (PROVENTIL HFA,VENTOLIN HFA) 90 mcg/act inhaler, Inhale 2 puffs every 6 (six) hours as needed for wheezing, Disp: , Rfl:     amLODIPine (NORVASC) 10 mg tablet, Take 1 tablet (10 mg total) by mouth daily, Disp: 90 tablet, Rfl: 3    amoxicillin (AMOXIL) 500 mg capsule, Take 2,000 mg by mouth if needed Prior to dental work, Disp: , Rfl:     famotidine (PEPCID) 40 MG tablet, TAKE 1 TABLET (40 MG TOTAL) BY MOUTH DAILY AT BEDTIME AS NEEDED FOR HEARTBURN FOR UP TO 60 DOSES, Disp: 90 tablet, Rfl: 1    fexofenadine (ALLEGRA) 60 MG tablet, if needed, Disp: , Rfl:     finasteride (PROSCAR) 5 mg tablet, Take 5 mg by mouth in the morning., Disp: , Rfl:     hydrochlorothiazide (HYDRODIURIL) 25 mg tablet, Take 1 tablet (25 mg total) by mouth daily, Disp: 90 tablet, Rfl: 3    ibuprofen (MOTRIN) 200 mg tablet, every 6 (six) hours as needed, Disp: , Rfl:     levothyroxine 150 mcg tablet, , Disp: , Rfl:     Loperamide HCl (IMODIUM PO), Take by mouth, Disp: , Rfl:     Multiple Vitamins-Minerals (Mens 50+ Multivitamin) TABS, Take by mouth, Disp: , Rfl:     " oxymetazoline (AFRIN) 0.05 % nasal spray, 2 sprays by Each Nare route every 12 (twelve) hours as needed, Disp: , Rfl:     QUEtiapine (SEROquel) 50 mg tablet, , Disp: , Rfl:     rivaroxaban (Xarelto) 20 mg tablet, Take 1 tablet (20 mg total) by mouth daily with breakfast, Disp: 30 tablet, Rfl: 11    simvastatin (ZOCOR) 5 MG tablet, TAKE 1 TABLET BY MOUTH AT BEDTIME, Disp: 90 tablet, Rfl: 3    omeprazole (PriLOSEC) 20 mg delayed release capsule, Take 20 mg by mouth daily (Patient not taking: Reported on 1/8/2025), Disp: , Rfl:

## 2025-07-11 NOTE — ASSESSMENT & PLAN NOTE
S/P SC PPM implant History of atrial flutter ablation.   Continue AC with Xarelto. Overall he is asymptomatic and rate controlled.

## 2025-07-13 LAB
ELASTASE PANC STL-MCNT: 129 UG ELAST./G
FAT STL QL: NORMAL
G LAMBLIA AG STL QL IA: NEGATIVE
H PYLORI AG STL QL IA: NEGATIVE
NEUTRAL FAT STL QL: NORMAL

## 2025-07-17 ENCOUNTER — TELEPHONE (OUTPATIENT)
Dept: CARDIOLOGY CLINIC | Facility: CLINIC | Age: 70
End: 2025-07-17

## 2025-07-17 DIAGNOSIS — I48.3 TYPICAL ATRIAL FLUTTER (HCC): Primary | ICD-10-CM

## 2025-07-17 NOTE — TELEPHONE ENCOUNTER
----- Message from Karl Narayan MD sent at 7/17/2025  1:51 PM EDT -----  Please see echocardiogram order. I forgot to order this when I saw him in the office.

## 2025-07-24 ENCOUNTER — CLINICAL SUPPORT (OUTPATIENT)
Dept: GASTROENTEROLOGY | Facility: CLINIC | Age: 70
End: 2025-07-24
Payer: MEDICARE

## 2025-07-24 ENCOUNTER — TELEPHONE (OUTPATIENT)
Dept: GASTROENTEROLOGY | Facility: AMBULARY SURGERY CENTER | Age: 70
End: 2025-07-24

## 2025-07-24 DIAGNOSIS — K52.9 CHRONIC DIARRHEA: Primary | ICD-10-CM

## 2025-07-24 PROCEDURE — 91065 BREATH HYDROGEN/METHANE TEST: CPT | Performed by: STUDENT IN AN ORGANIZED HEALTH CARE EDUCATION/TRAINING PROGRAM

## 2025-07-24 PROCEDURE — PBNCHG PB NO CHARGE PLACEHOLDER: Performed by: STUDENT IN AN ORGANIZED HEALTH CARE EDUCATION/TRAINING PROGRAM

## 2025-08-12 ENCOUNTER — HOSPITAL ENCOUNTER (OUTPATIENT)
Dept: NON INVASIVE DIAGNOSTICS | Age: 70
Discharge: HOME | End: 2025-08-12
Attending: INTERNAL MEDICINE
Payer: MEDICARE

## 2025-08-20 ENCOUNTER — IN-CLINIC DEVICE VISIT (OUTPATIENT)
Dept: CARDIOLOGY CLINIC | Facility: CLINIC | Age: 70
End: 2025-08-20
Payer: MEDICARE

## 2025-08-20 DIAGNOSIS — Z95.0 CARDIAC PACEMAKER IN SITU: Primary | ICD-10-CM

## 2025-08-20 PROCEDURE — 93279 PRGRMG DEV EVAL PM/LDLS PM: CPT | Performed by: INTERNAL MEDICINE

## (undated) DEVICE — PINNACLE INTRODUCER SHEATH: Brand: PINNACLE

## (undated) DEVICE — CATH ABLATION FLEXABILITY SE 8FR BI-DIR F-J

## (undated) DEVICE — LEAD 3830 US MKT/ 69CM MRI LBBAP
Type: IMPLANTABLE DEVICE | Site: HEART | Status: NON-FUNCTIONAL
Brand: SELECTSECURE™ MRI SURESCAN™
Removed: 2025-03-20

## (undated) DEVICE — TUBING SET COOL POINT

## (undated) DEVICE — SLITTER ADJUSTABLE

## (undated) DEVICE — CATH GUIDING FIXED SHAPE 43CM

## (undated) DEVICE — RADIFOCUS GLIDEWIRE: Brand: GLIDEWIRE

## (undated) DEVICE — INTRO SHEATH PEEL AWAY 7FR

## (undated) DEVICE — CATH EP  INQUIRY 6F 2-5-2MM  MED CRV STERABLE  DECAPOLAR 110CM

## (undated) DEVICE — GUIDE SHEATH TRANSEPTAL SL2 8.5FR

## (undated) DEVICE — GUIDE SHEATH SRO 8.5 FR

## (undated) DEVICE — REF PATCH ENSITE X

## (undated) DEVICE — CATH EP 7FR DI-DIR 10-POLE DEFL CS 2-8-2 FJ

## (undated) DEVICE — DGW .035 FC J3MM 150CM TEF: Brand: EMERALD